# Patient Record
Sex: FEMALE | Race: WHITE | NOT HISPANIC OR LATINO
[De-identification: names, ages, dates, MRNs, and addresses within clinical notes are randomized per-mention and may not be internally consistent; named-entity substitution may affect disease eponyms.]

---

## 2021-06-28 PROBLEM — Z00.00 ENCOUNTER FOR PREVENTIVE HEALTH EXAMINATION: Status: ACTIVE | Noted: 2021-06-28

## 2021-07-15 ENCOUNTER — APPOINTMENT (OUTPATIENT)
Dept: THORACIC SURGERY | Facility: CLINIC | Age: 66
End: 2021-07-15
Payer: MEDICARE

## 2021-07-15 VITALS
DIASTOLIC BLOOD PRESSURE: 63 MMHG | RESPIRATION RATE: 17 BRPM | HEART RATE: 67 BPM | BODY MASS INDEX: 33.31 KG/M2 | TEMPERATURE: 97.3 F | SYSTOLIC BLOOD PRESSURE: 125 MMHG | WEIGHT: 188 LBS | HEIGHT: 63 IN | OXYGEN SATURATION: 96 %

## 2021-07-15 PROCEDURE — 99203 OFFICE O/P NEW LOW 30 MIN: CPT

## 2021-07-15 NOTE — PHYSICAL EXAM
[General Appearance - Alert] : alert [] : no respiratory distress [Respiration, Rhythm And Depth] : normal respiratory rhythm and effort [Exaggerated Use Of Accessory Muscles For Inspiration] : no accessory muscle use [Auscultation Breath Sounds / Voice Sounds] : lungs were clear to auscultation bilaterally [Apical Impulse] : the apical impulse was normal [Heart Rate And Rhythm] : heart rate was normal and rhythm regular [Heart Sounds] : normal S1 and S2 [Examination Of The Chest] : the chest was normal in appearance [2+] : left 2+ [Abnormal Walk] : normal gait [Oriented To Time, Place, And Person] : oriented to person, place, and time

## 2021-07-15 NOTE — REVIEW OF SYSTEMS
[Shortness Of Breath] : shortness of breath [Abdominal Pain] : abdominal pain [Heartburn] : heartburn [Negative] : Musculoskeletal

## 2021-07-16 ENCOUNTER — RESULT REVIEW (OUTPATIENT)
Age: 66
End: 2021-07-16

## 2021-07-16 NOTE — HISTORY OF PRESENT ILLNESS
[FreeTextEntry1] : 66 year old female, former smoker, quit in January after developing Covid, with a PMHx of HTN, TIA 7 yrs ago, COVID 1/21, IBS, migraines, chronic neck and back pain, cholecystectomy, and hiatal hernia repair (2010), now with recurrent hernia.\par \par CT chest/abdomen completed on 06/04/21:\par -small hiatal hernia\par -mild intrahepatic bile duct dilation on the basis of cholecystomy\par -tiny umbilical hernia \par \par EGD completed on 06/07/21:\par -large recurrence of her paraesophageal hernia in a type 2 fashion with a large paraesophageal hernia in a type 2 fashion with a large paraesophageal component with some extrinsic compression on the esophagus \par \par \par

## 2021-07-16 NOTE — ASSESSMENT
[FreeTextEntry1] : 66 year old female, former smoker, quit in January after developing Covid, with a PMHx of HTN, TIA 7 yrs ago, COVID 1/21, IBS, migraines, chronic neck and back pain, cholecystectomy, and hiatal hernia repair (2010), now with recurrent hernia.\par \par CT imaging was reviewed and with evidence of previous mesh noted. Patient admits to a long recovery from her previous hiatal hernia repair and nausea afterwards, however did have improvement from the surgical repair. \par \par Will plan for a barium esophagram and upper GI series to evaluate anatomy and determine surgical approach. Will also order a gastric emptying study to evaluate if her symptoms are related to an abnormal emptying which may be contributing to her symptoms. \par \par Will have the patient RTC after testing to determine surgical approach. Patient with symptomatic recurrent hiatal hernia, will need objective data prior to determining surgical approach.\par \par Plan:\par 1. Barium esophagram/ Upper GI series\par 2. Gastric emptying \par 3. RTC after testing \par \par I, BOBBY TAYLOR , am scribing for and in the presence of [Dr. Jacky Baker] the following sections: History of present illness, past Medical/family/surgical/family/social history, review of systems, vital signs, physical exam and disposition.\par \par Patient has evidence of a recurrent symptomatic hiatal hernia with previous mesh.  I concur with previous opinion that recommended surgery.  However, patient will require additional studies prior to proceeding with surgery which will help to determine whether or not this can be best performed through a trans-abdominal approach or trans-,-thoracic approach.\par

## 2021-07-19 ENCOUNTER — OUTPATIENT (OUTPATIENT)
Dept: OUTPATIENT SERVICES | Facility: HOSPITAL | Age: 66
LOS: 1 days | End: 2021-07-19
Payer: MEDICARE

## 2021-07-19 PROCEDURE — 78264 GASTRIC EMPTYING IMG STUDY: CPT | Mod: MG

## 2021-07-19 PROCEDURE — 78264 GASTRIC EMPTYING IMG STUDY: CPT | Mod: 26,MG

## 2021-07-19 PROCEDURE — G1004: CPT

## 2021-07-19 PROCEDURE — A9541: CPT

## 2021-07-28 ENCOUNTER — OUTPATIENT (OUTPATIENT)
Dept: OUTPATIENT SERVICES | Facility: HOSPITAL | Age: 66
LOS: 1 days | End: 2021-07-28

## 2021-07-28 ENCOUNTER — APPOINTMENT (OUTPATIENT)
Dept: RADIOLOGY | Facility: HOSPITAL | Age: 66
End: 2021-07-28
Payer: MEDICARE

## 2021-07-28 PROCEDURE — 74240 X-RAY XM UPR GI TRC 1CNTRST: CPT | Mod: 26

## 2021-07-29 ENCOUNTER — APPOINTMENT (OUTPATIENT)
Dept: THORACIC SURGERY | Facility: CLINIC | Age: 66
End: 2021-07-29
Payer: MEDICARE

## 2021-07-29 ENCOUNTER — INPATIENT (INPATIENT)
Facility: HOSPITAL | Age: 66
LOS: 5 days | Discharge: HOME | DRG: 392 | End: 2021-08-04
Attending: SPECIALIST | Admitting: SPECIALIST
Payer: MEDICARE

## 2021-07-29 VITALS
SYSTOLIC BLOOD PRESSURE: 130 MMHG | OXYGEN SATURATION: 98 % | HEIGHT: 63 IN | HEART RATE: 64 BPM | WEIGHT: 188.05 LBS | RESPIRATION RATE: 18 BRPM | DIASTOLIC BLOOD PRESSURE: 90 MMHG | TEMPERATURE: 98 F

## 2021-07-29 VITALS
HEART RATE: 67 BPM | DIASTOLIC BLOOD PRESSURE: 85 MMHG | RESPIRATION RATE: 17 BRPM | HEIGHT: 63 IN | TEMPERATURE: 97.8 F | OXYGEN SATURATION: 98 % | SYSTOLIC BLOOD PRESSURE: 128 MMHG | BODY MASS INDEX: 33.31 KG/M2 | WEIGHT: 188 LBS

## 2021-07-29 DIAGNOSIS — Z90.49 ACQUIRED ABSENCE OF OTHER SPECIFIED PARTS OF DIGESTIVE TRACT: Chronic | ICD-10-CM

## 2021-07-29 DIAGNOSIS — Z98.890 OTHER SPECIFIED POSTPROCEDURAL STATES: Chronic | ICD-10-CM

## 2021-07-29 DIAGNOSIS — K21.9 GASTRO-ESOPHAGEAL REFLUX DISEASE W/OUT ESOPHAGITIS: ICD-10-CM

## 2021-07-29 DIAGNOSIS — R10.9 UNSPECIFIED ABDOMINAL PAIN: ICD-10-CM

## 2021-07-29 DIAGNOSIS — K44.9 GASTRO-ESOPHAGEAL REFLUX DISEASE W/OUT ESOPHAGITIS: ICD-10-CM

## 2021-07-29 LAB
ALBUMIN SERPL ELPH-MCNC: 4.5 G/DL — SIGNIFICANT CHANGE UP (ref 3.3–5)
ALP SERPL-CCNC: 149 U/L — HIGH (ref 40–120)
ALT FLD-CCNC: 15 U/L — SIGNIFICANT CHANGE UP (ref 10–45)
ANION GAP SERPL CALC-SCNC: 10 MMOL/L — SIGNIFICANT CHANGE UP (ref 5–17)
APTT BLD: 28.1 SEC — SIGNIFICANT CHANGE UP (ref 27.5–35.5)
AST SERPL-CCNC: 20 U/L — SIGNIFICANT CHANGE UP (ref 10–40)
BASOPHILS # BLD AUTO: 0.05 K/UL — SIGNIFICANT CHANGE UP (ref 0–0.2)
BASOPHILS NFR BLD AUTO: 0.9 % — SIGNIFICANT CHANGE UP (ref 0–2)
BILIRUB SERPL-MCNC: 0.4 MG/DL — SIGNIFICANT CHANGE UP (ref 0.2–1.2)
BLD GP AB SCN SERPL QL: NEGATIVE — SIGNIFICANT CHANGE UP
BUN SERPL-MCNC: 22 MG/DL — SIGNIFICANT CHANGE UP (ref 7–23)
CALCIUM SERPL-MCNC: 9.7 MG/DL — SIGNIFICANT CHANGE UP (ref 8.4–10.5)
CHLORIDE SERPL-SCNC: 102 MMOL/L — SIGNIFICANT CHANGE UP (ref 96–108)
CO2 SERPL-SCNC: 28 MMOL/L — SIGNIFICANT CHANGE UP (ref 22–31)
CREAT SERPL-MCNC: 0.68 MG/DL — SIGNIFICANT CHANGE UP (ref 0.5–1.3)
EOSINOPHIL # BLD AUTO: 0.2 K/UL — SIGNIFICANT CHANGE UP (ref 0–0.5)
EOSINOPHIL NFR BLD AUTO: 3.7 % — SIGNIFICANT CHANGE UP (ref 0–6)
GLUCOSE SERPL-MCNC: 93 MG/DL — SIGNIFICANT CHANGE UP (ref 70–99)
HCT VFR BLD CALC: 41.5 % — SIGNIFICANT CHANGE UP (ref 34.5–45)
HGB BLD-MCNC: 14 G/DL — SIGNIFICANT CHANGE UP (ref 11.5–15.5)
IMM GRANULOCYTES NFR BLD AUTO: 0.4 % — SIGNIFICANT CHANGE UP (ref 0–1.5)
INR BLD: 0.9 — SIGNIFICANT CHANGE UP (ref 0.88–1.16)
LIDOCAIN IGE QN: 17 U/L — SIGNIFICANT CHANGE UP (ref 7–60)
LYMPHOCYTES # BLD AUTO: 1.71 K/UL — SIGNIFICANT CHANGE UP (ref 1–3.3)
LYMPHOCYTES # BLD AUTO: 31.4 % — SIGNIFICANT CHANGE UP (ref 13–44)
MCHC RBC-ENTMCNC: 32.7 PG — SIGNIFICANT CHANGE UP (ref 27–34)
MCHC RBC-ENTMCNC: 33.7 GM/DL — SIGNIFICANT CHANGE UP (ref 32–36)
MCV RBC AUTO: 97 FL — SIGNIFICANT CHANGE UP (ref 80–100)
MONOCYTES # BLD AUTO: 0.42 K/UL — SIGNIFICANT CHANGE UP (ref 0–0.9)
MONOCYTES NFR BLD AUTO: 7.7 % — SIGNIFICANT CHANGE UP (ref 2–14)
NEUTROPHILS # BLD AUTO: 3.05 K/UL — SIGNIFICANT CHANGE UP (ref 1.8–7.4)
NEUTROPHILS NFR BLD AUTO: 55.9 % — SIGNIFICANT CHANGE UP (ref 43–77)
NRBC # BLD: 0 /100 WBCS — SIGNIFICANT CHANGE UP (ref 0–0)
PLATELET # BLD AUTO: 286 K/UL — SIGNIFICANT CHANGE UP (ref 150–400)
POTASSIUM SERPL-MCNC: 3.9 MMOL/L — SIGNIFICANT CHANGE UP (ref 3.5–5.3)
POTASSIUM SERPL-SCNC: 3.9 MMOL/L — SIGNIFICANT CHANGE UP (ref 3.5–5.3)
PROT SERPL-MCNC: 7.4 G/DL — SIGNIFICANT CHANGE UP (ref 6–8.3)
PROTHROM AB SERPL-ACNC: 10.8 SEC — SIGNIFICANT CHANGE UP (ref 10.6–13.6)
RBC # BLD: 4.28 M/UL — SIGNIFICANT CHANGE UP (ref 3.8–5.2)
RBC # FLD: 13.3 % — SIGNIFICANT CHANGE UP (ref 10.3–14.5)
RH IG SCN BLD-IMP: NEGATIVE — SIGNIFICANT CHANGE UP
RH IG SCN BLD-IMP: NEGATIVE — SIGNIFICANT CHANGE UP
SARS-COV-2 RNA SPEC QL NAA+PROBE: SIGNIFICANT CHANGE UP
SODIUM SERPL-SCNC: 140 MMOL/L — SIGNIFICANT CHANGE UP (ref 135–145)
WBC # BLD: 5.45 K/UL — SIGNIFICANT CHANGE UP (ref 3.8–10.5)
WBC # FLD AUTO: 5.45 K/UL — SIGNIFICANT CHANGE UP (ref 3.8–10.5)

## 2021-07-29 PROCEDURE — 74178 CT ABD&PLV WO CNTR FLWD CNTR: CPT | Mod: 26,MG

## 2021-07-29 PROCEDURE — 99222 1ST HOSP IP/OBS MODERATE 55: CPT

## 2021-07-29 PROCEDURE — 99215 OFFICE O/P EST HI 40 MIN: CPT

## 2021-07-29 PROCEDURE — 99285 EMERGENCY DEPT VISIT HI MDM: CPT

## 2021-07-29 PROCEDURE — G1004: CPT

## 2021-07-29 RX ORDER — ONDANSETRON 8 MG/1
4 TABLET, FILM COATED ORAL EVERY 6 HOURS
Refills: 0 | Status: DISCONTINUED | OUTPATIENT
Start: 2021-07-29 | End: 2021-08-01

## 2021-07-29 RX ORDER — ONDANSETRON 8 MG/1
4 TABLET, FILM COATED ORAL EVERY 8 HOURS
Refills: 0 | Status: DISCONTINUED | OUTPATIENT
Start: 2021-07-29 | End: 2021-07-29

## 2021-07-29 RX ORDER — HYDROMORPHONE HYDROCHLORIDE 2 MG/ML
1 INJECTION INTRAMUSCULAR; INTRAVENOUS; SUBCUTANEOUS EVERY 4 HOURS
Refills: 0 | Status: DISCONTINUED | OUTPATIENT
Start: 2021-07-29 | End: 2021-07-29

## 2021-07-29 RX ORDER — ACETAMINOPHEN 500 MG
1000 TABLET ORAL ONCE
Refills: 0 | Status: COMPLETED | OUTPATIENT
Start: 2021-07-29 | End: 2021-07-30

## 2021-07-29 RX ORDER — IOHEXOL 300 MG/ML
30 INJECTION, SOLUTION INTRAVENOUS ONCE
Refills: 0 | Status: COMPLETED | OUTPATIENT
Start: 2021-07-29 | End: 2021-07-29

## 2021-07-29 RX ORDER — LOSARTAN POTASSIUM 100 MG/1
50 TABLET, FILM COATED ORAL DAILY
Refills: 0 | Status: DISCONTINUED | OUTPATIENT
Start: 2021-07-29 | End: 2021-08-04

## 2021-07-29 RX ORDER — PANTOPRAZOLE SODIUM 20 MG/1
40 TABLET, DELAYED RELEASE ORAL
Refills: 0 | Status: DISCONTINUED | OUTPATIENT
Start: 2021-07-29 | End: 2021-07-30

## 2021-07-29 RX ORDER — HYDROMORPHONE HYDROCHLORIDE 2 MG/ML
0.5 INJECTION INTRAMUSCULAR; INTRAVENOUS; SUBCUTANEOUS ONCE
Refills: 0 | Status: DISCONTINUED | OUTPATIENT
Start: 2021-07-29 | End: 2021-07-29

## 2021-07-29 RX ORDER — SODIUM CHLORIDE 9 MG/ML
1000 INJECTION INTRAMUSCULAR; INTRAVENOUS; SUBCUTANEOUS
Refills: 0 | Status: DISCONTINUED | OUTPATIENT
Start: 2021-07-29 | End: 2021-07-30

## 2021-07-29 RX ORDER — CYCLOBENZAPRINE HYDROCHLORIDE 10 MG/1
10 TABLET, FILM COATED ORAL THREE TIMES A DAY
Refills: 0 | Status: DISCONTINUED | OUTPATIENT
Start: 2021-07-29 | End: 2021-08-04

## 2021-07-29 RX ORDER — ONDANSETRON 8 MG/1
4 TABLET, FILM COATED ORAL ONCE
Refills: 0 | Status: COMPLETED | OUTPATIENT
Start: 2021-07-29 | End: 2021-07-29

## 2021-07-29 RX ORDER — CITALOPRAM 10 MG/1
20 TABLET, FILM COATED ORAL DAILY
Refills: 0 | Status: DISCONTINUED | OUTPATIENT
Start: 2021-07-29 | End: 2021-08-04

## 2021-07-29 RX ORDER — SODIUM CHLORIDE 9 MG/ML
1000 INJECTION INTRAMUSCULAR; INTRAVENOUS; SUBCUTANEOUS ONCE
Refills: 0 | Status: COMPLETED | OUTPATIENT
Start: 2021-07-29 | End: 2021-07-29

## 2021-07-29 RX ORDER — HYDROMORPHONE HYDROCHLORIDE 2 MG/ML
1 INJECTION INTRAMUSCULAR; INTRAVENOUS; SUBCUTANEOUS EVERY 4 HOURS
Refills: 0 | Status: DISCONTINUED | OUTPATIENT
Start: 2021-07-29 | End: 2021-08-01

## 2021-07-29 RX ORDER — AMLODIPINE BESYLATE 2.5 MG/1
2.5 TABLET ORAL DAILY
Refills: 0 | Status: DISCONTINUED | OUTPATIENT
Start: 2021-07-29 | End: 2021-08-04

## 2021-07-29 RX ORDER — SODIUM CHLORIDE 9 MG/ML
3 INJECTION INTRAMUSCULAR; INTRAVENOUS; SUBCUTANEOUS EVERY 8 HOURS
Refills: 0 | Status: DISCONTINUED | OUTPATIENT
Start: 2021-07-29 | End: 2021-08-04

## 2021-07-29 RX ORDER — HEPARIN SODIUM 5000 [USP'U]/ML
5000 INJECTION INTRAVENOUS; SUBCUTANEOUS EVERY 8 HOURS
Refills: 0 | Status: DISCONTINUED | OUTPATIENT
Start: 2021-07-29 | End: 2021-08-04

## 2021-07-29 RX ORDER — ZOLPIDEM TARTRATE 10 MG/1
5 TABLET ORAL AT BEDTIME
Refills: 0 | Status: DISCONTINUED | OUTPATIENT
Start: 2021-07-29 | End: 2021-08-04

## 2021-07-29 RX ORDER — CLONAZEPAM 1 MG
1 TABLET ORAL AT BEDTIME
Refills: 0 | Status: DISCONTINUED | OUTPATIENT
Start: 2021-07-29 | End: 2021-08-04

## 2021-07-29 RX ORDER — ONDANSETRON 8 MG/1
4 TABLET, FILM COATED ORAL EVERY 6 HOURS
Refills: 0 | Status: DISCONTINUED | OUTPATIENT
Start: 2021-07-29 | End: 2021-07-29

## 2021-07-29 RX ADMIN — HYDROMORPHONE HYDROCHLORIDE 0.5 MILLIGRAM(S): 2 INJECTION INTRAMUSCULAR; INTRAVENOUS; SUBCUTANEOUS at 16:00

## 2021-07-29 RX ADMIN — HYDROMORPHONE HYDROCHLORIDE 0.5 MILLIGRAM(S): 2 INJECTION INTRAMUSCULAR; INTRAVENOUS; SUBCUTANEOUS at 14:33

## 2021-07-29 RX ADMIN — ONDANSETRON 4 MILLIGRAM(S): 8 TABLET, FILM COATED ORAL at 20:38

## 2021-07-29 RX ADMIN — HEPARIN SODIUM 5000 UNIT(S): 5000 INJECTION INTRAVENOUS; SUBCUTANEOUS at 22:42

## 2021-07-29 RX ADMIN — ONDANSETRON 4 MILLIGRAM(S): 8 TABLET, FILM COATED ORAL at 14:33

## 2021-07-29 RX ADMIN — HYDROMORPHONE HYDROCHLORIDE 1 MILLIGRAM(S): 2 INJECTION INTRAMUSCULAR; INTRAVENOUS; SUBCUTANEOUS at 20:36

## 2021-07-29 RX ADMIN — SODIUM CHLORIDE 1000 MILLILITER(S): 9 INJECTION INTRAMUSCULAR; INTRAVENOUS; SUBCUTANEOUS at 14:33

## 2021-07-29 RX ADMIN — HYDROMORPHONE HYDROCHLORIDE 0.5 MILLIGRAM(S): 2 INJECTION INTRAMUSCULAR; INTRAVENOUS; SUBCUTANEOUS at 15:00

## 2021-07-29 RX ADMIN — SODIUM CHLORIDE 3 MILLILITER(S): 9 INJECTION INTRAMUSCULAR; INTRAVENOUS; SUBCUTANEOUS at 22:36

## 2021-07-29 RX ADMIN — HYDROMORPHONE HYDROCHLORIDE 0.5 MILLIGRAM(S): 2 INJECTION INTRAMUSCULAR; INTRAVENOUS; SUBCUTANEOUS at 15:48

## 2021-07-29 RX ADMIN — HYDROMORPHONE HYDROCHLORIDE 0.5 MILLIGRAM(S): 2 INJECTION INTRAMUSCULAR; INTRAVENOUS; SUBCUTANEOUS at 17:23

## 2021-07-29 RX ADMIN — IOHEXOL 30 MILLILITER(S): 300 INJECTION, SOLUTION INTRAVENOUS at 14:33

## 2021-07-29 RX ADMIN — Medication 30 MILLILITER(S): at 23:36

## 2021-07-29 NOTE — H&P ADULT - ASSESSMENT
66 year old female, former smoker, quit in January after developing Covid, with a PMHx of HTN, TIA 7 yrs ago, COVID 1/21, IBS, migraines, chronic neck and back pain, cholecystectomy, and hiatal hernia repair (2010), now with recurrent hernia. She presented to Dr. Baker office today to review esophagram and gastric emptying study. In office patient states she has been very nauseous with severe abdominal pain, vomiting multiple times during the day. Patient appears unwell and pale, referred to the ED For hydration, blood work, and CT Abdomen with IV/Oral contrast. Upon exam patient appears in discomfort, states pain on mildly controlled. Patient able to tolerate PO contrast. Patient otherwise denies dizziness, vision changes, chest pain, palpitations, shortness of breath, cough, n/v/d, extremity swelling, calf tenderness, sick contacts.     Neurovascular:   No delirium. Pain well controlled with current regimen.  -Tylenol PRN for pain    Cardiovascular:   Hemodynamically stable. HR controlled      Respiratory:   02 Sat = 98% on RA.  -Wean to RA from for O2 Sat > 93%.  -Encourage Cough, deep breathing and Use of IS 10x / hr while awake.  -Chest PT 4xdaily    GI:   PMHx Hiatial Hernia repair 2010 with recurrent herniation      - CT completed, final read as above  -protonix for GI protection  -NPO pending surgical reccomendations      Renal / :   BUN/Cr Stable  -Continue to monitor I/O's.    Endocrine:    Blood sugar stable      Hematologic:  H/H stable  -DVT prophylaxis with Heparin sq    ID:  -Afebrile  -Continue to observe for SIRS/Sepsis Syndrome.    Disposition:  Possible home 66 year old female, former smoker, quit in January after developing Covid, with a PMHx of HTN, TIA 7 yrs ago, COVID 1/21, IBS, migraines, chronic neck and back pain, cholecystectomy, and hiatal hernia repair (2010), now with recurrent hernia. She presented to Dr. Baker office today to review esophagram and gastric emptying study. In office patient states she has been very nauseous with severe abdominal pain, vomiting multiple times during the day. Patient appears unwell and pale, referred to the ED For hydration, blood work, and CT Abdomen with IV/Oral contrast. Upon exam patient appears in discomfort, states pain on mildly controlled. Patient able to tolerate PO contrast. Patient otherwise denies dizziness, vision changes, chest pain, palpitations, shortness of breath, cough, n/v/d, extremity swelling, calf tenderness, sick contacts.     Neurovascular:   No delirium. Pain well controlled with current regimen.  -Tylenol PRN for pain    Cardiovascular:   Hemodynamically stable. HR controlled      Respiratory:   02 Sat = 98% on RA.  -Wean to RA from for O2 Sat > 93%.  -Encourage Cough, deep breathing and Use of IS 10x / hr while awake.  -Chest PT 4xdaily    GI:   PMHx Hiatial Hernia repair 2010 with recurrent herniation      - CT completed, final read as above  -protonix for GI protection  -IV Hydration  -Clear liquid to assess ability to tolerate PO, can advance in AM to regular diet      Renal / :   BUN/Cr Stable  -Continue to monitor I/O's.    Endocrine:    Blood sugar stable      Hematologic:  H/H stable  -DVT prophylaxis with Heparin sq    ID:  -Afebrile  -Continue to observe for SIRS/Sepsis Syndrome.    Disposition:  Possible home

## 2021-07-29 NOTE — H&P ADULT - NSHPREVIEWOFSYSTEMS_GEN_ALL_CORE
66 year old female, former smoker, quit in January after developing Covid, with a PMHx of HTN, TIA 7 yrs ago, COVID 1/21, IBS, migraines, chronic neck and back pain, cholecystectomy, and hiatal hernia repair (2010), now with recurrent hernia. She presents today to review esophagram and gastric emptying study. She was initiall referred by  **    CT chest/abdomen completed on 06/04/21:  -small hiatal hernia  -mild intrahepatic bile duct dilation on the basis of cholecystomy  -tiny umbilical hernia     EGD completed on 06/07/21:  -large recurrence of her paraesophageal hernia in a type 2 fashion with a large paraesophageal hernia in a type 2 fashion with a large paraesophageal component with some extrinsic compression on the esophagus     gastric emptying study completed on 7/16/21:  -Normal gastric emptying study

## 2021-07-29 NOTE — REVIEW OF SYSTEMS
[Feeling Poorly] : feeling poorly [Abdominal Pain] : abdominal pain [Vomiting] : vomiting [Heartburn] : heartburn [Negative] : Musculoskeletal

## 2021-07-29 NOTE — ED ADULT NURSE NOTE - OBJECTIVE STATEMENT
c/o abdominal pain, vomiting (non-bloody), with associated lightheadedness for the past 3 weeks, worst in the last 5 days. Unable to tolerated PO. Epigastric pain 8/10, constant. hx hiatal hernia. scheduled to discuss surgery today but referred to ED by Dr Sifuentes due to symptoms

## 2021-07-29 NOTE — ED ADULT TRIAGE NOTE - CHIEF COMPLAINT QUOTE
c/o abdominal pain, vomiting (non-bloody), with associated lightheadedness for the past 5 days. hx hiatal hernia. scheduled to discuss surgery today but referred to ED by Dr Sifuentes due to symptoms

## 2021-07-29 NOTE — H&P ADULT - NSHPLABSRESULTS_GEN_ALL_CORE
< from: CT Abdomen and Pelvis w/ Oral Cont and w/wo IV Cont (07.29.21 @ 16:18) >    Bowel/Peritoneum: Redemonstrated partial herniation of fundoplication wrap into chest with slippage through intact wrap. High density material in large and small bowel loops related to recent upper GI exam. No bowel obstruction or evidence of bowel inflammation. Nonvisualized appendix.. Few colonic diverticula.    Lymph nodes: No lymphadenopathy.    Aorta/IVC: Normal caliber.    Abdominal wall: Tiny fat-containing umbilical hernia.    Bones/Soft tissues: Within normal limits.    IMPRESSION:    Redemonstrated partial herniation of fundoplication wrap into chest with slippage.    --- End of Report ---    < end of copied text >      CT chest/abdomen completed on 06/04/21:  -small hiatal hernia  -mild intrahepatic bile duct dilation on the basis of cholecystomy  -tiny umbilical hernia     EGD completed on 06/07/21:  -large recurrence of her paraesophageal hernia in a type 2 fashion with a large paraesophageal hernia in a type 2 fashion with a large paraesophageal component with some extrinsic compression on the esophagus     gastric emptying study completed on 7/16/21:  -Normal gastric emptying study

## 2021-07-29 NOTE — H&P ADULT - NSICDXPASTMEDICALHX_GEN_ALL_CORE_FT
PAST MEDICAL HISTORY:  History of COVID-19 1/2021    History of TIAs 2014    HLD (hyperlipidemia)     HTN (hypertension)

## 2021-07-29 NOTE — ED PROVIDER NOTE - CLINICAL SUMMARY MEDICAL DECISION MAKING FREE TEXT BOX
avss. nontoxic. NAD. no systemic sx. no active cp. no acute resp distress. no acute surgical abd. no e/o sepsis. LFTs/lipase neg. ua neg. pain controlled s/p dilaudid. cts consulted. will admit per reccs.

## 2021-07-29 NOTE — ED PROVIDER NOTE - PROGRESS NOTE DETAILS
cts consulted. reccs for ct a/p w/ oral contrast w/wo iv contrast. will see pt. cts reccs for admission to dr hare.

## 2021-07-29 NOTE — H&P ADULT - NSHPPHYSICALEXAM_GEN_ALL_CORE
Neuro: A+O x 3, non-focal, speech clear and intact  HEENT: PERRL, EOMI, oral mucosa pink and moist  Neck: supple, no JVD  CV: regular rate, regular rhythm, +S1S2, no murmurs or rub  Pulm/chest: lung sounds CTA and equal bilaterally, no accessory muscle use noted  Abd: soft, tender to palpation throughout  Ext: SCHULER x 4, no C/C/E  Skin: warm, well perfused, no rashes

## 2021-07-29 NOTE — H&P ADULT - NSHPSOCIALHISTORY_GEN_ALL_CORE
66 year old female, former smoker, quit in January after developing Covid, with a PMHx of HTN, TIA 7 yrs ago, COVID 1/21, IBS, migraines, chronic neck and back pain, cholecystectomy, and hiatal hernia repair (2010), now with recurrent hernia.    CT chest/abdomen completed on 06/04/21:  -small hiatal hernia  -mild intrahepatic bile duct dilation on the basis of cholecystomy  -tiny umbilical hernia     EGD completed on 06/07/21:  -large recurrence of her paraesophageal hernia in a type 2 fashion with a large paraesophageal hernia in a type 2 fashion with a large paraesophageal component with some extrinsic compression on the esophagus       Hiatal hernia with GERD (530.81,553.3) (K21.9,K44.9)    66 year old female, former smoker, quit in January after developing Covid, with a PMHx of HTN, TIA 7 yrs ago, COVID 1/21, IBS, migraines, chronic neck and back pain, cholecystectomy, and hiatal hernia repair (2010), now with recurrent hernia.    CT imaging was reviewed and with evidence of previous mesh noted. Patient admits to a long recovery from her previous hiatal hernia repair and nausea afterwards, however did have improvement from the surgical repair.     Will plan for a barium esophagram and upper GI series to evaluate anatomy and determine surgical approach. Will also order a gastric emptying study to evaluate if her symptoms are related to an abnormal emptying which may be contributing to her symptoms.     Will have the patient RTC after testing to determine surgical approach. Patient with symptomatic recurrent hiatal hernia, will need objective data prior to determining surgical approach.    Plan:  1. Barium esophagram/ Upper GI series  2. Gastric emptying   3. RTC after testing     IBOBBY am scribing for and in the presence of [Dr. Jacky Baker] the following sections: History of present illness, past Medical/family/surgical/family/social history, review of systems, vital signs, physical exam and disposition.    Patient has evidence of a recurrent symptomatic hiatal hernia with previous mesh. I concur with previous opinion that recommended surgery. However, patient will require additional studies prior to proceeding with surgery which will help to determine whether or not this can be best performed through a trans-abdominal approach or trans-,-thoracic approach. Tobacco:  Alcohol:  Elicit drugs:  Occupation:  Home: Lives with , Stairs, assist devices, ADL

## 2021-07-29 NOTE — ED PROVIDER NOTE - PHYSICAL EXAMINATION
CONST: nontoxic NAD speaking in full sentences  HEAD: atraumatic  EYES: conjunctivae clear, PERRL, EOMI  ENT: mmm  NECK: supple/FROM  CARD: rrr no murmurs  CHEST: ctab no r/r/w  ABD: soft, nd, +ttp epigastric, no rebound/guarding  EXT: FROM, symmetric distal pulses intact  SKIN: warm, dry, no rash, no pedal edema/ttp/rash, cap refill <2sec  NEURO: a+ox3, 5/5 strength x4, gross sensation intact x4, baseline gait

## 2021-07-29 NOTE — PHYSICAL EXAM
[] : no respiratory distress [Respiration, Rhythm And Depth] : normal respiratory rhythm and effort [Exaggerated Use Of Accessory Muscles For Inspiration] : no accessory muscle use [Auscultation Breath Sounds / Voice Sounds] : lungs were clear to auscultation bilaterally [Apical Impulse] : the apical impulse was normal [Heart Rate And Rhythm] : heart rate was normal and rhythm regular [Heart Sounds] : normal S1 and S2 [Examination Of The Chest] : the chest was normal in appearance [2+] : left 2+ [Abdomen Soft] : soft [Abnormal Walk] : normal gait [Oriented To Time, Place, And Person] : oriented to person, place, and time

## 2021-07-29 NOTE — ED PROVIDER NOTE - OBJECTIVE STATEMENT
66F former smoker, htn, tia (2014), cholecystectomy, hiatal hernia s/p mesh repair, prior covid19 infection not req hosptialization (1/2021), referred in by dr hare for admission. pt c/o progressively worsening epigastric abd pain/po interolance since mesh repair. last saw cts for preop planning (7/15/21). no fever/chills, no uri/cough, no cp/sob, no vomiting, no diarrhea, no hematochezia/melena, no dysuria, no covid19 vaccination, no trauma, no etoh-dpt/ivdu.     cts: ananya 66F former smoker, htn, tia (2014), cholecystectomy, hiatal hernia s/p mesh repair, prior covid19 infection not req hospitalization (1/2021), referred in by dr hare for admission. pt c/o progressively worsening epigastric abd pain/po intolerance since mesh repair. last saw cts for preop planning (7/15/21). no fever/chills, no uri/cough, no cp/sob, no vomiting, no diarrhea, no hematochezia/melena, no dysuria, no covid19 vaccination, no trauma, no etoh-dpt/ivdu.     cts: ananya

## 2021-07-29 NOTE — H&P ADULT - HISTORY OF PRESENT ILLNESS
66 year old female, former smoker, quit in January after developing Covid, with a PMHx of HTN, TIA 7 yrs ago, COVID 1/21, IBS, migraines, chronic neck and back pain, cholecystectomy, and hiatal hernia repair (2010), now with recurrent hernia. She presented to Dr. Baker office today to review esophagram and gastric emptying study. In office patient states she has been very nauseous with severe abdominal pain, vomitting multiple times during the day. Patient appears unwell and pale, referred to the ED For hydration, bloodwork and CT Abdomen with IV/Oral contrast. Upon exam patient appears in discomfort, states pain on mildly controlled. Patient able to tolerate PO contrast. Patient otherwise denies dizziness, vision changes, chest pain, palpitations, shortness of breath, cough, n/v/d, extremity swelling, calf tenderness, sick contacts.     Patient not currently vaccinated, awaiting COVID swab.  66 year old female, former smoker, quit in January after developing Covid, with a PMHx of HTN, TIA 7 yrs ago, COVID 1/21, IBS, migraines, chronic neck and back pain, cholecystectomy, and hiatal hernia repair (2010), now with recurrent hernia. She presented to Dr. Baker office today to review esophagram and gastric emptying study. In office patient states she has been very nauseous with severe abdominal pain, vomitting multiple times during the day. Patient appears unwell and pale, referred to the ED For hydration, bloodwork and CT Abdomen with IV/Oral contrast. Upon exam patient appears in discomfort, states pain on mildly controlled typically takes 1-1.5mg of dilaudid for her pain. Patient able to tolerate PO contrast. Patient otherwise denies dizziness, vision changes, chest pain, palpitations, shortness of breath, cough, n/v/d, extremity swelling, calf tenderness, sick contacts.     Patient not currently vaccinated, awaiting COVID swab.

## 2021-07-30 ENCOUNTER — TRANSCRIPTION ENCOUNTER (OUTPATIENT)
Age: 66
End: 2021-07-30

## 2021-07-30 LAB
ALBUMIN SERPL ELPH-MCNC: 4.4 G/DL — SIGNIFICANT CHANGE UP (ref 3.3–5)
ALP SERPL-CCNC: 147 U/L — HIGH (ref 40–120)
ALT FLD-CCNC: 33 U/L — SIGNIFICANT CHANGE UP (ref 10–45)
ANION GAP SERPL CALC-SCNC: 10 MMOL/L — SIGNIFICANT CHANGE UP (ref 5–17)
AST SERPL-CCNC: 45 U/L — HIGH (ref 10–40)
BASOPHILS # BLD AUTO: 0.03 K/UL — SIGNIFICANT CHANGE UP (ref 0–0.2)
BASOPHILS NFR BLD AUTO: 0.6 % — SIGNIFICANT CHANGE UP (ref 0–2)
BILIRUB SERPL-MCNC: 0.4 MG/DL — SIGNIFICANT CHANGE UP (ref 0.2–1.2)
BUN SERPL-MCNC: 22 MG/DL — SIGNIFICANT CHANGE UP (ref 7–23)
CALCIUM SERPL-MCNC: 9.6 MG/DL — SIGNIFICANT CHANGE UP (ref 8.4–10.5)
CHLORIDE SERPL-SCNC: 100 MMOL/L — SIGNIFICANT CHANGE UP (ref 96–108)
CO2 SERPL-SCNC: 26 MMOL/L — SIGNIFICANT CHANGE UP (ref 22–31)
COVID-19 SPIKE DOMAIN AB INTERP: POSITIVE
COVID-19 SPIKE DOMAIN ANTIBODY RESULT: 33.5 U/ML — HIGH
CREAT SERPL-MCNC: 0.77 MG/DL — SIGNIFICANT CHANGE UP (ref 0.5–1.3)
EOSINOPHIL # BLD AUTO: 0.18 K/UL — SIGNIFICANT CHANGE UP (ref 0–0.5)
EOSINOPHIL NFR BLD AUTO: 3.6 % — SIGNIFICANT CHANGE UP (ref 0–6)
GLUCOSE SERPL-MCNC: 114 MG/DL — HIGH (ref 70–99)
HCT VFR BLD CALC: 38.4 % — SIGNIFICANT CHANGE UP (ref 34.5–45)
HCV AB S/CO SERPL IA: 0.04 S/CO — SIGNIFICANT CHANGE UP
HCV AB SERPL-IMP: SIGNIFICANT CHANGE UP
HGB BLD-MCNC: 13 G/DL — SIGNIFICANT CHANGE UP (ref 11.5–15.5)
IMM GRANULOCYTES NFR BLD AUTO: 0.4 % — SIGNIFICANT CHANGE UP (ref 0–1.5)
LYMPHOCYTES # BLD AUTO: 1.1 K/UL — SIGNIFICANT CHANGE UP (ref 1–3.3)
LYMPHOCYTES # BLD AUTO: 22.2 % — SIGNIFICANT CHANGE UP (ref 13–44)
MAGNESIUM SERPL-MCNC: 2.4 MG/DL — SIGNIFICANT CHANGE UP (ref 1.6–2.6)
MCHC RBC-ENTMCNC: 33.1 PG — SIGNIFICANT CHANGE UP (ref 27–34)
MCHC RBC-ENTMCNC: 33.9 GM/DL — SIGNIFICANT CHANGE UP (ref 32–36)
MCV RBC AUTO: 97.7 FL — SIGNIFICANT CHANGE UP (ref 80–100)
MONOCYTES # BLD AUTO: 0.32 K/UL — SIGNIFICANT CHANGE UP (ref 0–0.9)
MONOCYTES NFR BLD AUTO: 6.5 % — SIGNIFICANT CHANGE UP (ref 2–14)
NEUTROPHILS # BLD AUTO: 3.31 K/UL — SIGNIFICANT CHANGE UP (ref 1.8–7.4)
NEUTROPHILS NFR BLD AUTO: 66.7 % — SIGNIFICANT CHANGE UP (ref 43–77)
NRBC # BLD: 0 /100 WBCS — SIGNIFICANT CHANGE UP (ref 0–0)
PLATELET # BLD AUTO: 261 K/UL — SIGNIFICANT CHANGE UP (ref 150–400)
POTASSIUM SERPL-MCNC: 4.6 MMOL/L — SIGNIFICANT CHANGE UP (ref 3.5–5.3)
POTASSIUM SERPL-SCNC: 4.6 MMOL/L — SIGNIFICANT CHANGE UP (ref 3.5–5.3)
PROT SERPL-MCNC: 7.1 G/DL — SIGNIFICANT CHANGE UP (ref 6–8.3)
RBC # BLD: 3.93 M/UL — SIGNIFICANT CHANGE UP (ref 3.8–5.2)
RBC # FLD: 13.1 % — SIGNIFICANT CHANGE UP (ref 10.3–14.5)
SARS-COV-2 IGG+IGM SERPL QL IA: 33.5 U/ML — HIGH
SARS-COV-2 IGG+IGM SERPL QL IA: POSITIVE
SODIUM SERPL-SCNC: 136 MMOL/L — SIGNIFICANT CHANGE UP (ref 135–145)
WBC # BLD: 4.96 K/UL — SIGNIFICANT CHANGE UP (ref 3.8–10.5)
WBC # FLD AUTO: 4.96 K/UL — SIGNIFICANT CHANGE UP (ref 3.8–10.5)

## 2021-07-30 PROCEDURE — 43235 EGD DIAGNOSTIC BRUSH WASH: CPT

## 2021-07-30 PROCEDURE — 71046 X-RAY EXAM CHEST 2 VIEWS: CPT | Mod: 26

## 2021-07-30 PROCEDURE — 99222 1ST HOSP IP/OBS MODERATE 55: CPT | Mod: 25

## 2021-07-30 PROCEDURE — 99232 SBSQ HOSP IP/OBS MODERATE 35: CPT

## 2021-07-30 PROCEDURE — 71045 X-RAY EXAM CHEST 1 VIEW: CPT | Mod: 26,59

## 2021-07-30 RX ORDER — SODIUM CHLORIDE 9 MG/ML
1000 INJECTION INTRAMUSCULAR; INTRAVENOUS; SUBCUTANEOUS
Refills: 0 | Status: DISCONTINUED | OUTPATIENT
Start: 2021-07-30 | End: 2021-08-02

## 2021-07-30 RX ORDER — PANTOPRAZOLE SODIUM 20 MG/1
40 TABLET, DELAYED RELEASE ORAL EVERY 12 HOURS
Refills: 0 | Status: DISCONTINUED | OUTPATIENT
Start: 2021-07-30 | End: 2021-08-04

## 2021-07-30 RX ORDER — ACETAMINOPHEN 500 MG
1000 TABLET ORAL ONCE
Refills: 0 | Status: COMPLETED | OUTPATIENT
Start: 2021-07-30 | End: 2021-07-30

## 2021-07-30 RX ADMIN — HYDROMORPHONE HYDROCHLORIDE 1 MILLIGRAM(S): 2 INJECTION INTRAMUSCULAR; INTRAVENOUS; SUBCUTANEOUS at 04:00

## 2021-07-30 RX ADMIN — SODIUM CHLORIDE 50 MILLILITER(S): 9 INJECTION INTRAMUSCULAR; INTRAVENOUS; SUBCUTANEOUS at 00:03

## 2021-07-30 RX ADMIN — SODIUM CHLORIDE 75 MILLILITER(S): 9 INJECTION INTRAMUSCULAR; INTRAVENOUS; SUBCUTANEOUS at 15:15

## 2021-07-30 RX ADMIN — ONDANSETRON 4 MILLIGRAM(S): 8 TABLET, FILM COATED ORAL at 21:43

## 2021-07-30 RX ADMIN — HYDROMORPHONE HYDROCHLORIDE 1 MILLIGRAM(S): 2 INJECTION INTRAMUSCULAR; INTRAVENOUS; SUBCUTANEOUS at 22:15

## 2021-07-30 RX ADMIN — HYDROMORPHONE HYDROCHLORIDE 1 MILLIGRAM(S): 2 INJECTION INTRAMUSCULAR; INTRAVENOUS; SUBCUTANEOUS at 03:36

## 2021-07-30 RX ADMIN — ONDANSETRON 4 MILLIGRAM(S): 8 TABLET, FILM COATED ORAL at 15:26

## 2021-07-30 RX ADMIN — SODIUM CHLORIDE 3 MILLILITER(S): 9 INJECTION INTRAMUSCULAR; INTRAVENOUS; SUBCUTANEOUS at 15:15

## 2021-07-30 RX ADMIN — PANTOPRAZOLE SODIUM 40 MILLIGRAM(S): 20 TABLET, DELAYED RELEASE ORAL at 17:26

## 2021-07-30 RX ADMIN — HYDROMORPHONE HYDROCHLORIDE 1 MILLIGRAM(S): 2 INJECTION INTRAMUSCULAR; INTRAVENOUS; SUBCUTANEOUS at 10:49

## 2021-07-30 RX ADMIN — HEPARIN SODIUM 5000 UNIT(S): 5000 INJECTION INTRAVENOUS; SUBCUTANEOUS at 06:28

## 2021-07-30 RX ADMIN — ONDANSETRON 4 MILLIGRAM(S): 8 TABLET, FILM COATED ORAL at 10:50

## 2021-07-30 RX ADMIN — Medication 400 MILLIGRAM(S): at 00:12

## 2021-07-30 RX ADMIN — Medication 1000 MILLIGRAM(S): at 10:00

## 2021-07-30 RX ADMIN — SODIUM CHLORIDE 3 MILLILITER(S): 9 INJECTION INTRAMUSCULAR; INTRAVENOUS; SUBCUTANEOUS at 21:12

## 2021-07-30 RX ADMIN — Medication 400 MILLIGRAM(S): at 09:47

## 2021-07-30 RX ADMIN — ONDANSETRON 4 MILLIGRAM(S): 8 TABLET, FILM COATED ORAL at 03:48

## 2021-07-30 RX ADMIN — Medication 400 MILLIGRAM(S): at 18:44

## 2021-07-30 RX ADMIN — SODIUM CHLORIDE 3 MILLILITER(S): 9 INJECTION INTRAMUSCULAR; INTRAVENOUS; SUBCUTANEOUS at 05:39

## 2021-07-30 RX ADMIN — HEPARIN SODIUM 5000 UNIT(S): 5000 INJECTION INTRAVENOUS; SUBCUTANEOUS at 21:43

## 2021-07-30 RX ADMIN — HEPARIN SODIUM 5000 UNIT(S): 5000 INJECTION INTRAVENOUS; SUBCUTANEOUS at 15:21

## 2021-07-30 RX ADMIN — HYDROMORPHONE HYDROCHLORIDE 1 MILLIGRAM(S): 2 INJECTION INTRAMUSCULAR; INTRAVENOUS; SUBCUTANEOUS at 21:45

## 2021-07-30 RX ADMIN — HYDROMORPHONE HYDROCHLORIDE 1 MILLIGRAM(S): 2 INJECTION INTRAMUSCULAR; INTRAVENOUS; SUBCUTANEOUS at 11:00

## 2021-07-30 RX ADMIN — Medication 1000 MILLIGRAM(S): at 01:59

## 2021-07-30 NOTE — PROGRESS NOTE ADULT - SUBJECTIVE AND OBJECTIVE BOX
Patient discussed on morning rounds with Dr. Baker      Operation / Date: Recurrent Hiatal hernia    SUBJECTIVE ASSESSMENT:  66y Female seen and examined at bedside.  Patient states she had nausea with clears overnight.  EGD today.        Vital Signs Last 24 Hrs  T(C): 36.3 (30 Jul 2021 13:35), Max: 36.6 (29 Jul 2021 15:46)  T(F): 97.4 (30 Jul 2021 13:35), Max: 97.9 (30 Jul 2021 01:12)  HR: 66 (30 Jul 2021 08:38) (51 - 70)  BP: 112/64 (30 Jul 2021 08:38) (85/59 - 159/87)  BP(mean): 84 (30 Jul 2021 08:38) (84 - 113)  RR: 16 (30 Jul 2021 08:38) (16 - 20)  SpO2: 98% (30 Jul 2021 08:38) (92% - 98%)  I&O's Detail        PHYSICAL EXAM:    Neuro: A+O x 3, non-focal, speech clear and intact  HEENT: PERRL, EOMI, oral mucosa pink and moist  Neck: supple, no JVD  CV: regular rate, regular rhythm, +S1S2, no murmurs or rub  Pulm/chest: lung sounds CTA and equal bilaterally, no accessory muscle use noted  Abd: soft, tender to palpation throughout  Ext: SCHULER x 4, no C/C/E  Skin: warm, well perfused, no rashes      LABS:                        13.0   4.96  )-----------( 261      ( 30 Jul 2021 06:41 )             38.4       COUMADIN:  No. REASON: .    PT/INR - ( 29 Jul 2021 14:26 )   PT: 10.8 sec;   INR: 0.90          PTT - ( 29 Jul 2021 14:26 )  PTT:28.1 sec    07-30    136  |  100  |  22  ----------------------------<  114<H>  4.6   |  26  |  0.77    Ca    9.6      30 Jul 2021 06:41  Mg     2.4     07-30    TPro  7.1  /  Alb  4.4  /  TBili  0.4  /  DBili  x   /  AST  45<H>  /  ALT  33  /  AlkPhos  147<H>  07-30          MEDICATIONS  (STANDING):  amLODIPine   Tablet 2.5 milliGRAM(s) Oral daily  citalopram 20 milliGRAM(s) Oral daily  heparin   Injectable 5000 Unit(s) SubCutaneous every 8 hours  losartan 50 milliGRAM(s) Oral daily  pantoprazole  Injectable 40 milliGRAM(s) IV Push every 12 hours  sodium chloride 0.9% lock flush 3 milliLiter(s) IV Push every 8 hours  sodium chloride 0.9%. 1000 milliLiter(s) (75 mL/Hr) IV Continuous <Continuous>    MEDICATIONS  (PRN):  aluminum hydroxide/magnesium hydroxide/simethicone Suspension 30 milliLiter(s) Oral every 4 hours PRN Dyspepsia  clonazePAM  Tablet 1 milliGRAM(s) Oral at bedtime PRN agitation/ insomnia  cyclobenzaprine 10 milliGRAM(s) Oral three times a day PRN Muscle Spasm  HYDROmorphone  Injectable 1 milliGRAM(s) IV Push every 4 hours PRN Moderate Pain (4 - 6)  ondansetron Injectable 4 milliGRAM(s) IV Push every 6 hours PRN Nausea and/or Vomiting  zolpidem 5 milliGRAM(s) Oral at bedtime PRN Insomnia        RADIOLOGY & ADDITIONAL TESTS:

## 2021-07-30 NOTE — CONSULT NOTE ADULT - ASSESSMENT
66 year old female, former smoker, quit in January after developing Covid, with a PMHx of HTN, TIA 7 yrs ago, COVID 1/21, IBS, migraines, chronic neck and back pain, cholecystectomy, and hiatal hernia repair (2010), now with recurrent hernia.     #Nausea, vomiting, hiatal hernia  - s/p EGD: large hiatal hernia was visualized at the distal esophagus. There was pooling of gastric contents refluxing back into the esophagus, with associated esophagitis. There was approximately 100 cc of fluid in the gastric fundus, that was suctioned out. There was some mild gastritis also visualized in the fundus. The procedure was subsequently aborted as patient became apneic and hypoxic.   - Protonix 40 mg IV q 12 h   - NPO for 4 hrs, then if well, clear diet and advance as tolerated   - Chest X-Ray (PA/lateral) does not appear to be suggestive of aspiration, but quite early postprocedurally and would have low threshold to start abx    Joanie Claudio MD  PGY-5, Gastroenterology Fellow  pager: [phone]

## 2021-07-30 NOTE — CONSULT NOTE ADULT - SUBJECTIVE AND OBJECTIVE BOX
GASTROENTEROLOGY CONSULT NOTE  HPI:  66 year old female, former smoker, quit in January after developing Covid, with a PMHx of HTN, TIA 7 yrs ago, COVID 1/21, IBS, migraines, chronic neck and back pain, cholecystectomy, and hiatal hernia repair (2010), now with recurrent hernia. She presented to Dr. Baker office today to review esophagram and gastric emptying study. In office patient states she has been very nauseous with severe abdominal pain, vomitting multiple times during the day. Patient appears unwell and pale, referred to the ED For hydration, bloodwork and CT Abdomen with IV/Oral contrast. Upon exam patient appears in discomfort, states pain on mildly controlled typically takes 1-1.5mg of dilaudid for her pain. Patient able to tolerate PO contrast. Patient otherwise denies dizziness, vision changes, chest pain, palpitations, shortness of breath, cough, n/v/d, extremity swelling, calf tenderness, sick contacts.     Patient not currently vaccinated, awaiting COVID swab.  (29 Jul 2021 16:39)    GI consulted for n/v, hiatal hernia. Patient seen and examined at bedside.     Allergies    penicillin (Unknown)    Intolerances      Home Medications:  amLODIPine 2.5 mg oral tablet: 1 tab(s) orally once a day (29 Jul 2021 18:08)  cetirizine 10 mg oral tablet: 1 tab(s) orally once a day (29 Jul 2021 18:08)  citalopram 20 mg oral tablet: 1 tab(s) orally once a day (29 Jul 2021 18:08)  clonazePAM 1 mg oral tablet: orally once a day (at bedtime), As Needed (29 Jul 2021 18:08)  cyclobenzaprine 10 mg oral tablet: 1 tab(s) orally 3 times a day, As Needed (29 Jul 2021 18:08)  Dexilant 60 mg oral delayed release capsule: 1 cap(s) orally once a day (29 Jul 2021 18:08)  dicyclomine 20 mg oral tablet: 1 tab(s) orally 4 times a day, As Needed (29 Jul 2021 18:08)  Imitrex 100 mg oral tablet: 1 tab(s) orally once, As Needed (29 Jul 2021 18:08)  losartan 50 mg oral tablet: 1 tab(s) orally once a day (29 Jul 2021 18:08)  Zofran 4 mg oral tablet: 1 tab(s) orally every 8 hours, As Needed (29 Jul 2021 18:08)  zolpidem 10 mg oral tablet: 1 tab(s) orally once a day (at bedtime) (29 Jul 2021 18:08)    MEDICATIONS:  MEDICATIONS  (STANDING):  amLODIPine   Tablet 2.5 milliGRAM(s) Oral daily  citalopram 20 milliGRAM(s) Oral daily  heparin   Injectable 5000 Unit(s) SubCutaneous every 8 hours  losartan 50 milliGRAM(s) Oral daily  pantoprazole  Injectable 40 milliGRAM(s) IV Push every 12 hours  sodium chloride 0.9% lock flush 3 milliLiter(s) IV Push every 8 hours  sodium chloride 0.9%. 1000 milliLiter(s) (75 mL/Hr) IV Continuous <Continuous>    MEDICATIONS  (PRN):  aluminum hydroxide/magnesium hydroxide/simethicone Suspension 30 milliLiter(s) Oral every 4 hours PRN Dyspepsia  clonazePAM  Tablet 1 milliGRAM(s) Oral at bedtime PRN agitation/ insomnia  cyclobenzaprine 10 milliGRAM(s) Oral three times a day PRN Muscle Spasm  HYDROmorphone  Injectable 1 milliGRAM(s) IV Push every 4 hours PRN Moderate Pain (4 - 6)  ondansetron Injectable 4 milliGRAM(s) IV Push every 6 hours PRN Nausea and/or Vomiting  zolpidem 5 milliGRAM(s) Oral at bedtime PRN Insomnia    PAST MEDICAL & SURGICAL HISTORY:  HTN (hypertension)    HLD (hyperlipidemia)    History of COVID-19  1/2021    History of TIAs  2014    S/P cholecystectomy    History of repair of hiatal hernia  2010      FAMILY HISTORY:    SOCIAL HISTORY:  Tobacco: [ ] Current, [ ] Former, [ ] Never; Pack Years:  Alcohol:  Illicit Drugs:    REVIEW OF SYSTEMS:  CONSTITUTIONAL: No weakness, fevers or chills  HEENT: No visual changes; No vertigo or throat pain   NECK: No pain or stiffness  RESPIRATORY: No cough, wheezing, hemoptysis; No shortness of breath  CARDIOVASCULAR: No chest pain or palpitations  GASTROINTESTINAL: As above.  GENITOURINARY: No dysuria, frequency or hematuria  NEUROLOGICAL: No numbness or weakness  SKIN: No itching, burning, rashes, or lesions   All other 10 review of systems is negative unless indicated above.    Vital Signs Last 24 Hrs  T(C): 36.3 (30 Jul 2021 13:35), Max: 36.6 (30 Jul 2021 01:12)  T(F): 97.4 (30 Jul 2021 13:35), Max: 97.9 (30 Jul 2021 01:12)  HR: 72 (30 Jul 2021 14:07) (59 - 72)  BP: 150/80 (30 Jul 2021 14:07) (85/59 - 159/87)  BP(mean): 108 (30 Jul 2021 14:07) (84 - 113)  RR: 16 (30 Jul 2021 14:07) (16 - 20)  SpO2: 98% (30 Jul 2021 14:07) (92% - 98%)      PHYSICAL EXAM:    General: in no acute distress  Eyes: Anicteric sclerae, moist conjunctivae  HENT: Moist mucous membranes  Neck: Trachea midline, supple  Lungs: Normal respiratory effort, no intercostal retractions  Cardiovascular: RRR  Abdomen: Soft, non-tender non-distended; No rebound or guarding  Extremities: Normal range of motion, No clubbing, cyanosis or edema  Neurological: Alert and oriented x3  Skin: Warm and dry. No obvious rash    LABS:                        13.0   4.96  )-----------( 261      ( 30 Jul 2021 06:41 )             38.4     07-30    136  |  100  |  22  ----------------------------<  114<H>  4.6   |  26  |  0.77    Ca    9.6      30 Jul 2021 06:41  Mg     2.4     07-30    TPro  7.1  /  Alb  4.4  /  TBili  0.4  /  DBili  x   /  AST  45<H>  /  ALT  33  /  AlkPhos  147<H>  07-30        PT/INR - ( 29 Jul 2021 14:26 )   PT: 10.8 sec;   INR: 0.90          PTT - ( 29 Jul 2021 14:26 )  PTT:28.1 sec    RADIOLOGY & ADDITIONAL STUDIES:     Reviewed

## 2021-07-30 NOTE — PROGRESS NOTE ADULT - ASSESSMENT
66 year old female, former smoker, quit in January after developing Covid, with a PMHx of HTN, TIA 7 yrs ago, COVID 1/21, IBS, migraines, chronic neck and back pain, cholecystectomy, and hiatal hernia repair (2010), now with recurrent hernia. She presented to Dr. Baker office today to review esophagram and gastric emptying study. In office patient states she has been very nauseous with severe abdominal pain, vomiting multiple times during the day. Patient appears unwell and pale, referred to the ED For hydration, blood work, and CT Abdomen with IV/Oral contrast. Upon exam patient appears in discomfort, states pain on mildly controlled. Patient able to tolerate PO contrast. Patient otherwise denies dizziness, vision changes, chest pain, palpitations, shortness of breath, cough, n/v/d, extremity swelling, calf tenderness, sick contacts.     Neurovascular:   No delirium. Pain well controlled with current regimen.  -Tylenol PRN for pain    Cardiovascular:   Hemodynamically stable. HR controlled      Respiratory:   02 Sat = 98% on RA.  -Wean to RA from for O2 Sat > 93%.  -Encourage Cough, deep breathing and Use of IS 10x / hr while awake.  -Chest PT 4xdaily    GI:   PMHx Hiatial Hernia repair 2010 with recurrent herniation      - CT completed, final read as above  -protonix 40mg IB BID for GI protection  -IV Hydration  -NPO  -EGD 7/30/21: esophagitis    Renal / :   BUN/Cr Stable  -Continue to monitor I/O's.    Endocrine:    Blood sugar stable      Hematologic:  H/H stable  -DVT prophylaxis with Heparin sq    ID:  -Afebrile  -Continue to observe for SIRS/Sepsis Syndrome.    Disposition:  Telemetry

## 2021-07-30 NOTE — CHART NOTE - NSCHARTNOTEFT_GEN_A_CORE
Patient is s/p EGD w/ Dr. Urena in the Endoscopy Suite w/ the following findings and recommendations.     Impressions:  A large hiatal hernia was visualized at the distal esophagus. There was pooling of gastric contents refluxing back into the esophagus, with associated esophagitis. There was approximately 100 cc of fluid in the gastric fundus, that was suctioned out. There was some mild gastritis also visualized in the fundus. The procedure was subsequently aborted as patient became apneic and hypoxic.     Recommendations:  - Protonix 40 mg IV q 12 h   - NPO for 4 hrs, then if well, clear diet and advance as tolerated   - Chest X-Ray (PA/lateral)      Joanie Claudio MD  PGY-5, Gastroenterology Fellow  pager: [phone]

## 2021-07-31 LAB
ANION GAP SERPL CALC-SCNC: 14 MMOL/L — SIGNIFICANT CHANGE UP (ref 5–17)
BUN SERPL-MCNC: 14 MG/DL — SIGNIFICANT CHANGE UP (ref 7–23)
CALCIUM SERPL-MCNC: 9 MG/DL — SIGNIFICANT CHANGE UP (ref 8.4–10.5)
CHLORIDE SERPL-SCNC: 106 MMOL/L — SIGNIFICANT CHANGE UP (ref 96–108)
CO2 SERPL-SCNC: 17 MMOL/L — LOW (ref 22–31)
CREAT SERPL-MCNC: 0.64 MG/DL — SIGNIFICANT CHANGE UP (ref 0.5–1.3)
GLUCOSE SERPL-MCNC: 91 MG/DL — SIGNIFICANT CHANGE UP (ref 70–99)
HCT VFR BLD CALC: 37.1 % — SIGNIFICANT CHANGE UP (ref 34.5–45)
HGB BLD-MCNC: 12.5 G/DL — SIGNIFICANT CHANGE UP (ref 11.5–15.5)
MAGNESIUM SERPL-MCNC: 2.2 MG/DL — SIGNIFICANT CHANGE UP (ref 1.6–2.6)
MCHC RBC-ENTMCNC: 33.3 PG — SIGNIFICANT CHANGE UP (ref 27–34)
MCHC RBC-ENTMCNC: 33.7 GM/DL — SIGNIFICANT CHANGE UP (ref 32–36)
MCV RBC AUTO: 98.9 FL — SIGNIFICANT CHANGE UP (ref 80–100)
NRBC # BLD: 0 /100 WBCS — SIGNIFICANT CHANGE UP (ref 0–0)
PLATELET # BLD AUTO: 203 K/UL — SIGNIFICANT CHANGE UP (ref 150–400)
POTASSIUM SERPL-MCNC: 4.3 MMOL/L — SIGNIFICANT CHANGE UP (ref 3.5–5.3)
POTASSIUM SERPL-SCNC: 4.3 MMOL/L — SIGNIFICANT CHANGE UP (ref 3.5–5.3)
RBC # BLD: 3.75 M/UL — LOW (ref 3.8–5.2)
RBC # FLD: 13.1 % — SIGNIFICANT CHANGE UP (ref 10.3–14.5)
SODIUM SERPL-SCNC: 137 MMOL/L — SIGNIFICANT CHANGE UP (ref 135–145)
WBC # BLD: 4.33 K/UL — SIGNIFICANT CHANGE UP (ref 3.8–10.5)
WBC # FLD AUTO: 4.33 K/UL — SIGNIFICANT CHANGE UP (ref 3.8–10.5)

## 2021-07-31 PROCEDURE — 99231 SBSQ HOSP IP/OBS SF/LOW 25: CPT

## 2021-07-31 PROCEDURE — 71045 X-RAY EXAM CHEST 1 VIEW: CPT | Mod: 26

## 2021-07-31 RX ORDER — SUMATRIPTAN SUCCINATE 4 MG/.5ML
100 INJECTION, SOLUTION SUBCUTANEOUS ONCE
Refills: 0 | Status: COMPLETED | OUTPATIENT
Start: 2021-07-31 | End: 2021-07-31

## 2021-07-31 RX ADMIN — HYDROMORPHONE HYDROCHLORIDE 1 MILLIGRAM(S): 2 INJECTION INTRAMUSCULAR; INTRAVENOUS; SUBCUTANEOUS at 14:45

## 2021-07-31 RX ADMIN — LOSARTAN POTASSIUM 50 MILLIGRAM(S): 100 TABLET, FILM COATED ORAL at 05:36

## 2021-07-31 RX ADMIN — HEPARIN SODIUM 5000 UNIT(S): 5000 INJECTION INTRAVENOUS; SUBCUTANEOUS at 05:36

## 2021-07-31 RX ADMIN — SUMATRIPTAN SUCCINATE 100 MILLIGRAM(S): 4 INJECTION, SOLUTION SUBCUTANEOUS at 10:08

## 2021-07-31 RX ADMIN — HYDROMORPHONE HYDROCHLORIDE 1 MILLIGRAM(S): 2 INJECTION INTRAMUSCULAR; INTRAVENOUS; SUBCUTANEOUS at 19:23

## 2021-07-31 RX ADMIN — SODIUM CHLORIDE 75 MILLILITER(S): 9 INJECTION INTRAMUSCULAR; INTRAVENOUS; SUBCUTANEOUS at 00:32

## 2021-07-31 RX ADMIN — SODIUM CHLORIDE 3 MILLILITER(S): 9 INJECTION INTRAMUSCULAR; INTRAVENOUS; SUBCUTANEOUS at 22:34

## 2021-07-31 RX ADMIN — PANTOPRAZOLE SODIUM 40 MILLIGRAM(S): 20 TABLET, DELAYED RELEASE ORAL at 05:37

## 2021-07-31 RX ADMIN — AMLODIPINE BESYLATE 2.5 MILLIGRAM(S): 2.5 TABLET ORAL at 05:36

## 2021-07-31 RX ADMIN — ONDANSETRON 4 MILLIGRAM(S): 8 TABLET, FILM COATED ORAL at 16:57

## 2021-07-31 RX ADMIN — HYDROMORPHONE HYDROCHLORIDE 1 MILLIGRAM(S): 2 INJECTION INTRAMUSCULAR; INTRAVENOUS; SUBCUTANEOUS at 04:35

## 2021-07-31 RX ADMIN — HYDROMORPHONE HYDROCHLORIDE 1 MILLIGRAM(S): 2 INJECTION INTRAMUSCULAR; INTRAVENOUS; SUBCUTANEOUS at 19:35

## 2021-07-31 RX ADMIN — HYDROMORPHONE HYDROCHLORIDE 1 MILLIGRAM(S): 2 INJECTION INTRAMUSCULAR; INTRAVENOUS; SUBCUTANEOUS at 05:05

## 2021-07-31 RX ADMIN — ONDANSETRON 4 MILLIGRAM(S): 8 TABLET, FILM COATED ORAL at 22:44

## 2021-07-31 RX ADMIN — SODIUM CHLORIDE 3 MILLILITER(S): 9 INJECTION INTRAMUSCULAR; INTRAVENOUS; SUBCUTANEOUS at 13:43

## 2021-07-31 RX ADMIN — HYDROMORPHONE HYDROCHLORIDE 1 MILLIGRAM(S): 2 INJECTION INTRAMUSCULAR; INTRAVENOUS; SUBCUTANEOUS at 14:30

## 2021-07-31 RX ADMIN — HYDROMORPHONE HYDROCHLORIDE 1 MILLIGRAM(S): 2 INJECTION INTRAMUSCULAR; INTRAVENOUS; SUBCUTANEOUS at 10:25

## 2021-07-31 RX ADMIN — SODIUM CHLORIDE 3 MILLILITER(S): 9 INJECTION INTRAMUSCULAR; INTRAVENOUS; SUBCUTANEOUS at 05:37

## 2021-07-31 RX ADMIN — HYDROMORPHONE HYDROCHLORIDE 1 MILLIGRAM(S): 2 INJECTION INTRAMUSCULAR; INTRAVENOUS; SUBCUTANEOUS at 10:10

## 2021-07-31 RX ADMIN — HEPARIN SODIUM 5000 UNIT(S): 5000 INJECTION INTRAVENOUS; SUBCUTANEOUS at 14:29

## 2021-07-31 RX ADMIN — PANTOPRAZOLE SODIUM 40 MILLIGRAM(S): 20 TABLET, DELAYED RELEASE ORAL at 18:41

## 2021-07-31 RX ADMIN — SUMATRIPTAN SUCCINATE 100 MILLIGRAM(S): 4 INJECTION, SOLUTION SUBCUTANEOUS at 09:08

## 2021-07-31 RX ADMIN — HEPARIN SODIUM 5000 UNIT(S): 5000 INJECTION INTRAVENOUS; SUBCUTANEOUS at 22:33

## 2021-07-31 RX ADMIN — CITALOPRAM 20 MILLIGRAM(S): 10 TABLET, FILM COATED ORAL at 11:48

## 2021-07-31 RX ADMIN — ONDANSETRON 4 MILLIGRAM(S): 8 TABLET, FILM COATED ORAL at 10:10

## 2021-07-31 NOTE — PROGRESS NOTE ADULT - ASSESSMENT
66 year old female, former smoker, quit in January after developing Covid, with a PMHx of HTN, TIA 7yrs ago, COVID 1/21, IBS, migraines, chronic neck and back pain, cholecystectomy, and hiatal hernia repair (2010), now with recurrent hernia. She presented to Dr. Baker office today to review esophagram and gastric emptying study. In office patient states she has been very nauseous with severe abdominal pain, vomiting multiple times during the day. Patient appears unwell and pale, referred to the ED For hydration, blood work, and CT Abdomen with IV/Oral contrast.     Neurovascular:   No delirium. Pain well controlled with current regimen.  -Tylenol PRN for pain    Cardiovascular:   Hemodynamically stable. HR controlled    Respiratory:   02 Sat = 98% on RA.  -Wean to RA from for O2 Sat > 93%.  -Encourage Cough, deep breathing and Use of IS 10x / hr while awake.  -Chest PT 4xdaily    GI:   PMHx Hiatial Hernia repair 2010 with recurrent herniation      - CT completed, final read as above  -protonix 40mg IB BID for GI protection  -IV Hydration  -NPO  -EGD 7/30/21: esophagitis    Renal / :   BUN/Cr Stable: 14/0.64  -Continue to monitor I/O's.    Endocrine:    Blood sugar stable      Hematologic:  H/H stable: 12.5/37.1  -DVT prophylaxis with Heparin sq    ID:  -Afebrile  -WBC 4.33  -Continue to observe for SIRS/Sepsis Syndrome.    Disposition:  Telemetry

## 2021-07-31 NOTE — PROGRESS NOTE ADULT - SUBJECTIVE AND OBJECTIVE BOX
Patient discussed on morning rounds with Dr. Baker      Reason for Admission: Hiatal Hernia     SUBJECTIVE ASSESSMENT:  66y Female. NAEO. EGD c/b c/f aspiration. Patient c/o nausea and pain with movement. Patient does not want to ambulate or move out of bed. Patient otherwise denies sob, palpitations, n/v/d/c.    Vital Signs Last 24 Hrs  T(C): 36.6 (31 Jul 2021 17:35), Max: 36.6 (30 Jul 2021 21:42)  T(F): 97.8 (31 Jul 2021 17:35), Max: 97.8 (30 Jul 2021 21:42)  HR: 83 (31 Jul 2021 13:00) (70 - 83)  BP: 153/67 (31 Jul 2021 13:00) (109/59 - 153/67)  BP(mean): 97 (31 Jul 2021 13:00) (81 - 97)  RR: 20 (31 Jul 2021 13:00) (16 - 21)  SpO2: 97% (31 Jul 2021 13:00) (93% - 98%)  I&O's Detail    30 Jul 2021 07:01  -  31 Jul 2021 07:00  --------------------------------------------------------  IN:    sodium chloride 0.9%: 825 mL  Total IN: 825 mL    OUT:    Voided (mL): 500 mL  Total OUT: 500 mL    Total NET: 325 mL      31 Jul 2021 07:01  -  31 Jul 2021 18:08  --------------------------------------------------------  IN:    Oral Fluid: 120 mL    sodium chloride 0.9%: 525 mL  Total IN: 645 mL    OUT:  Total OUT: 0 mL    Total NET: 645 mL      CHEST TUBE:  No.    ANTHONY DRAIN:   No.  EPICARDIAL WIRES:  No.  TIE DOWNS:  No.  POSADAS: No.    PHYSICAL EXAM:  GEN: NAD, looks comfortable  Psych: Mood appropriate  Neuro: A&Ox3.  No focal deficits.  Moving all extremities.   HEENT: No obvious abnormalities  CV: S1S2, regular, no murmurs appreciated.  No carotid bruits.  No JVD  Lungs: Clear B/L.  No wheezing, rales or rhonchi  ABD: Soft, non-tender, non-distended.  +Bowel sounds  EXT: Warm and well perfused.  No peripheral edema noted  Musculoskeletal: Moving all extremities with normal ROM, no joint swelling  PV: Pedal pulses palpable    LABS:                        12.5   4.33  )-----------( 203      ( 31 Jul 2021 05:54 )             37.1       COUMADIN:  No.     07-31    137  |  106  |  14  ----------------------------<  91  4.3   |  17<L>  |  0.64    Ca    9.0      31 Jul 2021 05:52  Mg     2.2     07-31    TPro  7.1  /  Alb  4.4  /  TBili  0.4  /  DBili  x   /  AST  45<H>  /  ALT  33  /  AlkPhos  147<H>  07-30      MEDICATIONS  (STANDING):  amLODIPine   Tablet 2.5 milliGRAM(s) Oral daily  citalopram 20 milliGRAM(s) Oral daily  heparin   Injectable 5000 Unit(s) SubCutaneous every 8 hours  losartan 50 milliGRAM(s) Oral daily  pantoprazole  Injectable 40 milliGRAM(s) IV Push every 12 hours  sodium chloride 0.9% lock flush 3 milliLiter(s) IV Push every 8 hours  sodium chloride 0.9%. 1000 milliLiter(s) (75 mL/Hr) IV Continuous <Continuous>    MEDICATIONS  (PRN):  aluminum hydroxide/magnesium hydroxide/simethicone Suspension 30 milliLiter(s) Oral every 4 hours PRN Dyspepsia  clonazePAM  Tablet 1 milliGRAM(s) Oral at bedtime PRN agitation/ insomnia  cyclobenzaprine 10 milliGRAM(s) Oral three times a day PRN Muscle Spasm  HYDROmorphone  Injectable 1 milliGRAM(s) IV Push every 4 hours PRN Moderate Pain (4 - 6)  ondansetron Injectable 4 milliGRAM(s) IV Push every 6 hours PRN Nausea and/or Vomiting  zolpidem 5 milliGRAM(s) Oral at bedtime PRN Insomnia        RADIOLOGY & ADDITIONAL TESTS:  < from: Xray Chest 1 View- PORTABLE-Routine (Xray Chest 1 View- PORTABLE-Routine in AM.) (07.31.21 @ 04:54) >  INTERPRETATION:  Clinical History: Postop    Frontal examination of the chest demonstrates the heart to be within normal limits in transverse diameter. No rates. Residual contrast noted left upper abdomen. Mild degenerative changes thoracic spine.    IMPRESSION: No acute infiltrates    < end of copied text >

## 2021-07-31 NOTE — HISTORY OF PRESENT ILLNESS
[FreeTextEntry1] : 66 year old female, former smoker, quit in January after developing Covid, with a PMHx of HTN, TIA 7 yrs ago, COVID 1/21, IBS, migraines, chronic neck and back pain, cholecystectomy, and hiatal hernia repair (2010), now with recurrent hernia. She presents today to review esophagram and gastric emptying study. \par \par CT chest/abdomen completed on 06/04/21:\par -small hiatal hernia\par -mild intrahepatic bile duct dilation on the basis of cholecystomy\par -tiny umbilical hernia \par \par EGD completed on 06/07/21:\par -large recurrence of her paraesophageal hernia in a type 2 fashion with a large paraesophageal hernia in a type 2 fashion with a large paraesophageal component with some extrinsic compression on the esophagus \par \par gastric emptying study completed on 7/16/21:\par -Normal gastric emptying study\par \par Barium esophagram completed on ***\par ***

## 2021-07-31 NOTE — ASSESSMENT
[FreeTextEntry1] : 66 year old female, former smoker, quit in January after developing Covid, with a PMHx of HTN, TIA 7 yrs ago, COVID 1/21, IBS, migraines, chronic neck and back pain, cholecystectomy, and hiatal hernia repair (2010), now with recurrent hernia. She presents today to review esophagram and gastric emptying study. \par \par Patient admits to feeling nauseous, weakness and vomiting for the past few days. On physical exam her mucous membranes appear dry and is likely dehydrated. \par \par Barium esophagram was reviewed and revealed partial herniation of the fundoplication wrap. I explained that ultimately her hernia needs to be repaired, however at this time will admit to the ER for hydration and r/o infectious cause of abdominal pain. \par \par Plan:\par 1. Admit to the ER for IV hydration and CT abdomen/pelvis with PO and oral contrast\par \par I, BOBBY TAYLOR , am scribing for and in the presence of [Dr. Jacky Baker] the following sections: History of present illness, past Medical/family/surgical/family/social history, review of systems, vital signs, physical exam and disposition.\par  \par There is no evidence of acute abdomen on physical exam, but the patient has intractable nausea with emesis, and will require emergent evaluation.  I do not see any evidence of obstruction on current imaging.  Will admit and have gastroenterology evaluate for endoscopy.

## 2021-07-31 NOTE — PROGRESS NOTE ADULT - ASSESSMENT
66 year old female, former smoker, quit in January after developing Covid, with a PMHx of HTN, TIA 7 yrs ago, COVID 1/21, IBS, migraines, chronic neck and back pain, cholecystectomy, and hiatal hernia repair (2010), now with recurrent hernia.     #Nausea, vomiting, hiatal hernia  - s/p EGD: large hiatal hernia was visualized at the distal esophagus. There was pooling of gastric contents refluxing back into the esophagus, with associated esophagitis. There was approximately 100 cc of fluid in the gastric fundus, that was suctioned out. There was some mild gastritis also visualized in the fundus. The procedure was subsequently aborted as patient became apneic and hypoxic.   - Protonix 40 mg IV q 12 h   - patient's symptoms are most likely 2/2 recurrence of hiatal hernia; the inflammation in her esophagus and stomach are more likely sequela of the HH as opposed to the etiology of her n/v   - will d/w CT surgery team if value in repeating endoscopy to complete evaluation of upper GI tract     Joanie Claudio MD  PGY-5, Gastroenterology Fellow  pager: [phone]

## 2021-07-31 NOTE — PROGRESS NOTE ADULT - SUBJECTIVE AND OBJECTIVE BOX
GASTROENTEROLOGY PROGRESS NOTE  Patient seen and examined at bedside. Still having episodes of nausea and epigastric/chest pain.     PERTINENT REVIEW OF SYSTEMS:  CONSTITUTIONAL: No weakness, fevers or chills  HEENT: No visual changes; No vertigo or throat pain   GASTROINTESTINAL: As above.  NEUROLOGICAL: No numbness or weakness  SKIN: No itching, burning, rashes, or lesions     Allergies    penicillin (Unknown)    Intolerances      MEDICATIONS:  MEDICATIONS  (STANDING):  amLODIPine   Tablet 2.5 milliGRAM(s) Oral daily  citalopram 20 milliGRAM(s) Oral daily  heparin   Injectable 5000 Unit(s) SubCutaneous every 8 hours  losartan 50 milliGRAM(s) Oral daily  pantoprazole  Injectable 40 milliGRAM(s) IV Push every 12 hours  sodium chloride 0.9% lock flush 3 milliLiter(s) IV Push every 8 hours  sodium chloride 0.9%. 1000 milliLiter(s) (75 mL/Hr) IV Continuous <Continuous>    MEDICATIONS  (PRN):  aluminum hydroxide/magnesium hydroxide/simethicone Suspension 30 milliLiter(s) Oral every 4 hours PRN Dyspepsia  clonazePAM  Tablet 1 milliGRAM(s) Oral at bedtime PRN agitation/ insomnia  cyclobenzaprine 10 milliGRAM(s) Oral three times a day PRN Muscle Spasm  HYDROmorphone  Injectable 1 milliGRAM(s) IV Push every 4 hours PRN Moderate Pain (4 - 6)  ondansetron Injectable 4 milliGRAM(s) IV Push every 6 hours PRN Nausea and/or Vomiting  zolpidem 5 milliGRAM(s) Oral at bedtime PRN Insomnia    Vital Signs Last 24 Hrs  T(C): 36.4 (31 Jul 2021 09:25), Max: 36.6 (30 Jul 2021 21:42)  T(F): 97.5 (31 Jul 2021 09:25), Max: 97.8 (30 Jul 2021 21:42)  HR: 83 (31 Jul 2021 13:00) (70 - 83)  BP: 153/67 (31 Jul 2021 13:00) (109/59 - 153/67)  BP(mean): 97 (31 Jul 2021 13:00) (81 - 97)  RR: 20 (31 Jul 2021 13:00) (16 - 21)  SpO2: 97% (31 Jul 2021 13:00) (93% - 98%)    07-30 @ 07:01  -  07-31 @ 07:00  --------------------------------------------------------  IN: 825 mL / OUT: 500 mL / NET: 325 mL    07-31 @ 07:01 - 07-31 @ 17:12  --------------------------------------------------------  IN: 645 mL / OUT: 0 mL / NET: 645 mL      PHYSICAL EXAM:    General: in no acute distress  HEENT: MMM, conjunctiva and sclera clear  Gastrointestinal: Soft non-tender non-distended; No rebound or guarding  Skin: Warm and dry. No obvious rash    LABS:                        12.5   4.33  )-----------( 203 ( 31 Jul 2021 05:54 )             37.1     07-31    137  |  106  |  14  ----------------------------<  91  4.3   |  17<L>  |  0.64    Ca    9.0      31 Jul 2021 05:52  Mg     2.2     07-31    TPro  7.1  /  Alb  4.4  /  TBili  0.4  /  DBili  x   /  AST  45<H>  /  ALT  33  /  AlkPhos  147<H>  07-30                      RADIOLOGY & ADDITIONAL STUDIES:  Reviewed

## 2021-08-01 LAB
ALBUMIN SERPL ELPH-MCNC: 4.2 G/DL — SIGNIFICANT CHANGE UP (ref 3.3–5)
ALP SERPL-CCNC: 145 U/L — HIGH (ref 40–120)
ALT FLD-CCNC: 22 U/L — SIGNIFICANT CHANGE UP (ref 10–45)
ANION GAP SERPL CALC-SCNC: 16 MMOL/L — SIGNIFICANT CHANGE UP (ref 5–17)
AST SERPL-CCNC: 24 U/L — SIGNIFICANT CHANGE UP (ref 10–40)
BILIRUB SERPL-MCNC: 0.4 MG/DL — SIGNIFICANT CHANGE UP (ref 0.2–1.2)
BUN SERPL-MCNC: 9 MG/DL — SIGNIFICANT CHANGE UP (ref 7–23)
CALCIUM SERPL-MCNC: 9.7 MG/DL — SIGNIFICANT CHANGE UP (ref 8.4–10.5)
CHLORIDE SERPL-SCNC: 102 MMOL/L — SIGNIFICANT CHANGE UP (ref 96–108)
CO2 SERPL-SCNC: 18 MMOL/L — LOW (ref 22–31)
CREAT SERPL-MCNC: 0.62 MG/DL — SIGNIFICANT CHANGE UP (ref 0.5–1.3)
GLUCOSE SERPL-MCNC: 66 MG/DL — LOW (ref 70–99)
HCT VFR BLD CALC: 41 % — SIGNIFICANT CHANGE UP (ref 34.5–45)
HGB BLD-MCNC: 13.8 G/DL — SIGNIFICANT CHANGE UP (ref 11.5–15.5)
MAGNESIUM SERPL-MCNC: 2.2 MG/DL — SIGNIFICANT CHANGE UP (ref 1.6–2.6)
MCHC RBC-ENTMCNC: 33.1 PG — SIGNIFICANT CHANGE UP (ref 27–34)
MCHC RBC-ENTMCNC: 33.7 GM/DL — SIGNIFICANT CHANGE UP (ref 32–36)
MCV RBC AUTO: 98.3 FL — SIGNIFICANT CHANGE UP (ref 80–100)
NRBC # BLD: 0 /100 WBCS — SIGNIFICANT CHANGE UP (ref 0–0)
PLATELET # BLD AUTO: 215 K/UL — SIGNIFICANT CHANGE UP (ref 150–400)
POTASSIUM SERPL-MCNC: 4.3 MMOL/L — SIGNIFICANT CHANGE UP (ref 3.5–5.3)
POTASSIUM SERPL-SCNC: 4.3 MMOL/L — SIGNIFICANT CHANGE UP (ref 3.5–5.3)
PROT SERPL-MCNC: 7 G/DL — SIGNIFICANT CHANGE UP (ref 6–8.3)
RBC # BLD: 4.17 M/UL — SIGNIFICANT CHANGE UP (ref 3.8–5.2)
RBC # FLD: 13 % — SIGNIFICANT CHANGE UP (ref 10.3–14.5)
SODIUM SERPL-SCNC: 136 MMOL/L — SIGNIFICANT CHANGE UP (ref 135–145)
WBC # BLD: 6.26 K/UL — SIGNIFICANT CHANGE UP (ref 3.8–10.5)
WBC # FLD AUTO: 6.26 K/UL — SIGNIFICANT CHANGE UP (ref 3.8–10.5)

## 2021-08-01 PROCEDURE — 70450 CT HEAD/BRAIN W/O DYE: CPT | Mod: 26

## 2021-08-01 PROCEDURE — 71045 X-RAY EXAM CHEST 1 VIEW: CPT | Mod: 26

## 2021-08-01 PROCEDURE — 99232 SBSQ HOSP IP/OBS MODERATE 35: CPT

## 2021-08-01 RX ORDER — ONDANSETRON 8 MG/1
4 TABLET, FILM COATED ORAL EVERY 6 HOURS
Refills: 0 | Status: DISCONTINUED | OUTPATIENT
Start: 2021-08-01 | End: 2021-08-04

## 2021-08-01 RX ORDER — ACETAMINOPHEN 500 MG
1000 TABLET ORAL ONCE
Refills: 0 | Status: COMPLETED | OUTPATIENT
Start: 2021-08-01 | End: 2021-08-01

## 2021-08-01 RX ADMIN — Medication 1000 MILLIGRAM(S): at 13:25

## 2021-08-01 RX ADMIN — HEPARIN SODIUM 5000 UNIT(S): 5000 INJECTION INTRAVENOUS; SUBCUTANEOUS at 06:09

## 2021-08-01 RX ADMIN — Medication 1000 MILLIGRAM(S): at 22:41

## 2021-08-01 RX ADMIN — HEPARIN SODIUM 5000 UNIT(S): 5000 INJECTION INTRAVENOUS; SUBCUTANEOUS at 14:55

## 2021-08-01 RX ADMIN — HYDROMORPHONE HYDROCHLORIDE 1 MILLIGRAM(S): 2 INJECTION INTRAMUSCULAR; INTRAVENOUS; SUBCUTANEOUS at 07:50

## 2021-08-01 RX ADMIN — ZOLPIDEM TARTRATE 5 MILLIGRAM(S): 10 TABLET ORAL at 22:04

## 2021-08-01 RX ADMIN — ONDANSETRON 4 MILLIGRAM(S): 8 TABLET, FILM COATED ORAL at 06:09

## 2021-08-01 RX ADMIN — HYDROMORPHONE HYDROCHLORIDE 1 MILLIGRAM(S): 2 INJECTION INTRAMUSCULAR; INTRAVENOUS; SUBCUTANEOUS at 07:39

## 2021-08-01 RX ADMIN — ONDANSETRON 4 MILLIGRAM(S): 8 TABLET, FILM COATED ORAL at 12:55

## 2021-08-01 RX ADMIN — SODIUM CHLORIDE 3 MILLILITER(S): 9 INJECTION INTRAMUSCULAR; INTRAVENOUS; SUBCUTANEOUS at 14:55

## 2021-08-01 RX ADMIN — Medication 400 MILLIGRAM(S): at 12:55

## 2021-08-01 RX ADMIN — PANTOPRAZOLE SODIUM 40 MILLIGRAM(S): 20 TABLET, DELAYED RELEASE ORAL at 18:15

## 2021-08-01 RX ADMIN — PANTOPRAZOLE SODIUM 40 MILLIGRAM(S): 20 TABLET, DELAYED RELEASE ORAL at 06:08

## 2021-08-01 RX ADMIN — SODIUM CHLORIDE 3 MILLILITER(S): 9 INJECTION INTRAMUSCULAR; INTRAVENOUS; SUBCUTANEOUS at 06:10

## 2021-08-01 RX ADMIN — HEPARIN SODIUM 5000 UNIT(S): 5000 INJECTION INTRAVENOUS; SUBCUTANEOUS at 22:15

## 2021-08-01 RX ADMIN — ONDANSETRON 4 MILLIGRAM(S): 8 TABLET, FILM COATED ORAL at 18:15

## 2021-08-01 RX ADMIN — SODIUM CHLORIDE 75 MILLILITER(S): 9 INJECTION INTRAMUSCULAR; INTRAVENOUS; SUBCUTANEOUS at 09:00

## 2021-08-01 RX ADMIN — HYDROMORPHONE HYDROCHLORIDE 1 MILLIGRAM(S): 2 INJECTION INTRAMUSCULAR; INTRAVENOUS; SUBCUTANEOUS at 01:10

## 2021-08-01 RX ADMIN — Medication 400 MILLIGRAM(S): at 22:11

## 2021-08-01 RX ADMIN — SODIUM CHLORIDE 75 MILLILITER(S): 9 INJECTION INTRAMUSCULAR; INTRAVENOUS; SUBCUTANEOUS at 22:13

## 2021-08-01 RX ADMIN — Medication 1 MILLIGRAM(S): at 16:53

## 2021-08-01 RX ADMIN — HYDROMORPHONE HYDROCHLORIDE 1 MILLIGRAM(S): 2 INJECTION INTRAMUSCULAR; INTRAVENOUS; SUBCUTANEOUS at 01:26

## 2021-08-01 RX ADMIN — SODIUM CHLORIDE 3 MILLILITER(S): 9 INJECTION INTRAMUSCULAR; INTRAVENOUS; SUBCUTANEOUS at 21:02

## 2021-08-01 NOTE — PROGRESS NOTE ADULT - SUBJECTIVE AND OBJECTIVE BOX
Patient discussed on morning rounds with Dr. Baker     Operation / Date: hiatal hernia    SUBJECTIVE ASSESSMENT:  66y Female. C/o emesis overnight. Patient refusing medications, refusing trialing clear liquid diet, asking for zofran and pain medications. Stopped all IV narcotics. GI signing off on patient, attributing symptoms to hernia. Patient amiable in room, laughing with family, and simultaneously endorsing panic attack.    Vital Signs Last 24 Hrs  T(C): 36.7 (01 Aug 2021 17:38), Max: 36.8 (01 Aug 2021 14:12)  T(F): 98.1 (01 Aug 2021 17:38), Max: 98.3 (01 Aug 2021 14:12)  HR: 94 (01 Aug 2021 16:35) (84 - 96)  BP: 138/86 (01 Aug 2021 16:35) (125/68 - 141/90)  BP(mean): 106 (01 Aug 2021 16:35) (94 - 107)  RR: 19 (01 Aug 2021 16:35) (18 - 20)  SpO2: 96% (01 Aug 2021 16:35) (95% - 97%)  I&O's Detail    31 Jul 2021 07:01  -  01 Aug 2021 07:00  --------------------------------------------------------  IN:    Oral Fluid: 360 mL    sodium chloride 0.9%: 525 mL  Total IN: 885 mL    OUT:    Blood Loss (mL): 120 mL    Voided (mL): 600 mL  Total OUT: 720 mL    Total NET: 165 mL      01 Aug 2021 07:01  -  01 Aug 2021 18:19  --------------------------------------------------------  IN:    IV PiggyBack: 100 mL    sodium chloride 0.9%: 750 mL  Total IN: 850 mL    OUT:    Voided (mL): 650 mL  Total OUT: 650 mL    Total NET: 200 mL    CHEST TUBE:  No.   ANTHONY DRAIN:  No.  EPICARDIAL WIRES: No.  TIE DOWNS: No.  POSADAS: No.    PHYSICAL EXAM:  GEN: NAD, looks comfortable  Psych: Mood appropriate  Neuro: A&Ox3.  No focal deficits.  Moving all extremities.   HEENT: No obvious abnormalities  CV: S1S2, regular, no murmurs appreciated.  No carotid bruits.  No JVD  Lungs: Clear B/L.  No wheezing, rales or rhonchi  ABD: Soft, non-tender, non-distended.  +Bowel sounds  EXT: Warm and well perfused.  No peripheral edema noted  Musculoskeletal: Moving all extremities with normal ROM, no joint swelling  PV: Pedal pulses palpable    LABS:                        13.8   6.26  )-----------( 215      ( 01 Aug 2021 07:00 )             41.0     COUMADIN: No.     08-01    136  |  102  |  9   ----------------------------<  66<L>  4.3   |  18<L>  |  0.62    Ca    9.7      01 Aug 2021 07:00  Mg     2.2     08-01    TPro  7.0  /  Alb  4.2  /  TBili  0.4  /  DBili  x   /  AST  24  /  ALT  22  /  AlkPhos  145<H>  08-01      MEDICATIONS  (STANDING):  amLODIPine   Tablet 2.5 milliGRAM(s) Oral daily  citalopram 20 milliGRAM(s) Oral daily  heparin   Injectable 5000 Unit(s) SubCutaneous every 8 hours  losartan 50 milliGRAM(s) Oral daily  ondansetron Injectable 4 milliGRAM(s) IV Push every 6 hours  pantoprazole  Injectable 40 milliGRAM(s) IV Push every 12 hours  sodium chloride 0.9% lock flush 3 milliLiter(s) IV Push every 8 hours  sodium chloride 0.9%. 1000 milliLiter(s) (75 mL/Hr) IV Continuous <Continuous>    MEDICATIONS  (PRN):  aluminum hydroxide/magnesium hydroxide/simethicone Suspension 30 milliLiter(s) Oral every 4 hours PRN Dyspepsia  clonazePAM  Tablet 1 milliGRAM(s) Oral at bedtime PRN agitation/ insomnia  cyclobenzaprine 10 milliGRAM(s) Oral three times a day PRN Muscle Spasm  zolpidem 5 milliGRAM(s) Oral at bedtime PRN Insomnia        RADIOLOGY & ADDITIONAL TESTS:    < from: CT Head No Cont (08.01.21 @ 10:42) >    FINDINGS: The CT examination demonstrates the ventricles, cisternal spaces, and cortical sulci to be within normal limits. There is no midline shift or extra axial collections. The gray white differentiation appears within normal limits. There is no intracranial hemorrhage or acute transcortical infarct. There is a approximately 11 mm lesion which is predominantly calcified along the right posterior frontal convexity which may represent a meningioma (series 4 image 28 and series 17426 image 38).    The bony windows demonstrates no fractures. The visualized paranasal sinuses are within normal limits. The right mastoid air cells are underdeveloped. The left mastoid air cells are well aerated.    Limited evaluation of the TMJs demonstrates degenerative changes with joint space narrowing with condylar head flattening (right greater than left).    IMPRESSION: Approximately 11 mm right frontal convexity calcified meningioma.    --- End of Report ---    < end of copied text >   Patient discussed on morning rounds with Dr. Baker     Operation / Date: hiatal hernia    SUBJECTIVE ASSESSMENT:  66y Female. C/o emesis overnight. Patient refusing medications, refusing trialing clear liquid diet, asking for zofran and pain medications. Stopped all IV narcotics. GI signing off on patient, attributing symptoms to hernia. Patient amiable in room, laughing with family, and simultaneously endorsing panic attack.    Vital Signs Last 24 Hrs  T(C): 36.7 (01 Aug 2021 17:38), Max: 36.8 (01 Aug 2021 14:12)  T(F): 98.1 (01 Aug 2021 17:38), Max: 98.3 (01 Aug 2021 14:12)  HR: 94 (01 Aug 2021 16:35) (84 - 96)  BP: 138/86 (01 Aug 2021 16:35) (125/68 - 141/90)  BP(mean): 106 (01 Aug 2021 16:35) (94 - 107)  RR: 19 (01 Aug 2021 16:35) (18 - 20)  SpO2: 96% (01 Aug 2021 16:35) (95% - 97%)  I&O's Detail    31 Jul 2021 07:01  -  01 Aug 2021 07:00  --------------------------------------------------------  IN:    Oral Fluid: 360 mL    sodium chloride 0.9%: 525 mL  Total IN: 885 mL    OUT:    Blood Loss (mL): 120 mL    Voided (mL): 600 mL  Total OUT: 720 mL    Total NET: 165 mL      01 Aug 2021 07:01  -  01 Aug 2021 18:19  --------------------------------------------------------  IN:    IV PiggyBack: 100 mL    sodium chloride 0.9%: 750 mL  Total IN: 850 mL    OUT:    Voided (mL): 650 mL  Total OUT: 650 mL    Total NET: 200 mL    CHEST TUBE:  No.   ANTHONY DRAIN:  No.  EPICARDIAL WIRES: No.  TIE DOWNS: No.  POSADAS: No.    PHYSICAL EXAM:  GEN: NAD, looks comfortable  Psych: Mood appropriate  Neuro: A&Ox3.  No focal deficits.  Moving all extremities.   HEENT: No obvious abnormalities  CV: S1S2, regular, no murmurs appreciated.  No carotid bruits.  No JVD  Lungs: Clear B/L.  No wheezing, rales or rhonchi  ABD: Soft, non-tender, non-distended.  +Bowel sounds  EXT: Warm and well perfused.  No peripheral edema noted  Musculoskeletal: Moving all extremities with normal ROM, no joint swelling  PV: Pedal pulses palpable    LABS:                        13.8   6.26  )-----------( 215      ( 01 Aug 2021 07:00 )             41.0     COUMADIN: No.     08-01    136  |  102  |  9   ----------------------------<  66<L>  4.3   |  18<L>  |  0.62    Ca    9.7      01 Aug 2021 07:00  Mg     2.2     08-01    TPro  7.0  /  Alb  4.2  /  TBili  0.4  /  DBili  x   /  AST  24  /  ALT  22  /  AlkPhos  145<H>  08-01      MEDICATIONS  (STANDING):  amLODIPine   Tablet 2.5 milliGRAM(s) Oral daily  citalopram 20 milliGRAM(s) Oral daily  heparin   Injectable 5000 Unit(s) SubCutaneous every 8 hours  losartan 50 milliGRAM(s) Oral daily  ondansetron Injectable 4 milliGRAM(s) IV Push every 6 hours  pantoprazole  Injectable 40 milliGRAM(s) IV Push every 12 hours  sodium chloride 0.9% lock flush 3 milliLiter(s) IV Push every 8 hours  sodium chloride 0.9%. 1000 milliLiter(s) (75 mL/Hr) IV Continuous <Continuous>    MEDICATIONS  (PRN):  aluminum hydroxide/magnesium hydroxide/simethicone Suspension 30 milliLiter(s) Oral every 4 hours PRN Dyspepsia  clonazePAM  Tablet 1 milliGRAM(s) Oral at bedtime PRN agitation/ insomnia  cyclobenzaprine 10 milliGRAM(s) Oral three times a day PRN Muscle Spasm  zolpidem 5 milliGRAM(s) Oral at bedtime PRN Insomnia      RADIOLOGY & ADDITIONAL TESTS:    < from: CT Head No Cont (08.01.21 @ 10:42) >    FINDINGS: The CT examination demonstrates the ventricles, cisternal spaces, and cortical sulci to be within normal limits. There is no midline shift or extra axial collections. The gray white differentiation appears within normal limits. There is no intracranial hemorrhage or acute transcortical infarct. There is a approximately 11 mm lesion which is predominantly calcified along the right posterior frontal convexity which may represent a meningioma (series 4 image 28 and series 87374 image 38).    The bony windows demonstrates no fractures. The visualized paranasal sinuses are within normal limits. The right mastoid air cells are underdeveloped. The left mastoid air cells are well aerated.    Limited evaluation of the TMJs demonstrates degenerative changes with joint space narrowing with condylar head flattening (right greater than left).    IMPRESSION: Approximately 11 mm right frontal convexity calcified meningioma.    --- End of Report ---    < end of copied text >

## 2021-08-01 NOTE — PROGRESS NOTE ADULT - SUBJECTIVE AND OBJECTIVE BOX
GASTROENTEROLOGY PROGRESS NOTE  Patient seen and examined at bedside. Continues to have nausea, less emesis, though she is no longer drinking liquids as much.     PERTINENT REVIEW OF SYSTEMS:  CONSTITUTIONAL: No weakness, fevers or chills  HEENT: No visual changes; No vertigo or throat pain   GASTROINTESTINAL: As above.  NEUROLOGICAL: No numbness or weakness  SKIN: No itching, burning, rashes, or lesions     Allergies    penicillin (Unknown)    Intolerances      MEDICATIONS:  MEDICATIONS  (STANDING):  amLODIPine   Tablet 2.5 milliGRAM(s) Oral daily  citalopram 20 milliGRAM(s) Oral daily  heparin   Injectable 5000 Unit(s) SubCutaneous every 8 hours  losartan 50 milliGRAM(s) Oral daily  ondansetron Injectable 4 milliGRAM(s) IV Push every 6 hours  pantoprazole  Injectable 40 milliGRAM(s) IV Push every 12 hours  sodium chloride 0.9% lock flush 3 milliLiter(s) IV Push every 8 hours  sodium chloride 0.9%. 1000 milliLiter(s) (75 mL/Hr) IV Continuous <Continuous>    MEDICATIONS  (PRN):  aluminum hydroxide/magnesium hydroxide/simethicone Suspension 30 milliLiter(s) Oral every 4 hours PRN Dyspepsia  clonazePAM  Tablet 1 milliGRAM(s) Oral at bedtime PRN agitation/ insomnia  cyclobenzaprine 10 milliGRAM(s) Oral three times a day PRN Muscle Spasm  zolpidem 5 milliGRAM(s) Oral at bedtime PRN Insomnia    Vital Signs Last 24 Hrs  T(C): 36.8 (01 Aug 2021 14:12), Max: 36.8 (01 Aug 2021 14:12)  T(F): 98.3 (01 Aug 2021 14:12), Max: 98.3 (01 Aug 2021 14:12)  HR: 88 (01 Aug 2021 12:30) (76 - 96)  BP: 128/72 (01 Aug 2021 12:30) (106/56 - 141/90)  BP(mean): 94 (01 Aug 2021 12:30) (75 - 107)  RR: 20 (01 Aug 2021 12:30) (18 - 20)  SpO2: 97% (01 Aug 2021 12:30) (95% - 97%)    07-31 @ 07:01  -  08-01 @ 07:00  --------------------------------------------------------  IN: 885 mL / OUT: 720 mL / NET: 165 mL    08-01 @ 07:01  -  08-01 @ 15:07  --------------------------------------------------------  IN: 625 mL / OUT: 650 mL / NET: -25 mL      PHYSICAL EXAM:    General: in no acute distress  HEENT: MMM, conjunctiva and sclera clear  Gastrointestinal: Soft non-tender non-distended; No rebound or guarding  Skin: Warm and dry. No obvious rash    LABS:                        13.8   6.26  )-----------( 215      ( 01 Aug 2021 07:00 )             41.0     08-01    136  |  102  |  9   ----------------------------<  66<L>  4.3   |  18<L>  |  0.62    Ca    9.7      01 Aug 2021 07:00  Mg     2.2     08-01    TPro  7.0  /  Alb  4.2  /  TBili  0.4  /  DBili  x   /  AST  24  /  ALT  22  /  AlkPhos  145<H>  08-01                      RADIOLOGY & ADDITIONAL STUDIES:  Reviewed

## 2021-08-01 NOTE — PROGRESS NOTE ADULT - ASSESSMENT
66 year old female, former smoker, quit in January after developing Covid, with a PMHx of HTN, TIA 7 yrs ago, COVID 1/21, IBS, migraines, chronic neck and back pain, cholecystectomy, and hiatal hernia repair (2010), now with recurrent hernia.     #Nausea, vomiting, hiatal hernia  - s/p EGD: large hiatal hernia was visualized at the distal esophagus. There was pooling of gastric contents refluxing back into the esophagus, with associated esophagitis. There was approximately 100 cc of fluid in the gastric fundus, that was suctioned out. There was some mild gastritis also visualized in the fundus. The procedure was subsequently aborted as patient became apneic and hypoxic.   - Protonix 40 mg IV q 12 h   - patient's symptoms are most likely 2/2 recurrence of hiatal hernia; the inflammation in her esophagus and stomach are more likely sequela of the HH as opposed to the etiology of her n/v   - can obtain H pylori serum Ab; if positive, can treat w/ quadruple therapy    Thank you for allowing us to participate in the care of this patient.  GI will sign off. Please call back with any questions or concerns.     Joanie Claudio MD  PGY-5, Gastroenterology Fellow  pager: [phone]

## 2021-08-01 NOTE — PROGRESS NOTE ADULT - ASSESSMENT
66 year old female, former smoker, quit in January after developing Covid, with a PMHx of HTN, TIA 7yrs ago, COVID 1/21, IBS, migraines, chronic neck and back pain, cholecystectomy, and hiatal hernia repair (2010), now with a recurrent hernia. She presented to Dr. Baker's office to review esophagram and gastric emptying study. In office patient states she has been very nauseous with severe abdominal pain, vomiting multiple times during the day. Patient appeared unwell and pale and was referred to the ED For hydration, blood work, and CT Abdomen with IV/Oral contrast which confirmed hernia. Patient underwent EGD with GI 7/30 that revealed gastritits however was not completed 2/2 pt becoming apneic and hypoxic. Patient kept NPO, then transitioned to clear liquid diet. Patient tolerating sips of water, however refusing to try clears and advance her diet. Patient with 1 episode of emesis overnight. GI signed off with PPI recommendations and no further workup or evaluation. Patient went for CT head with c/f underlying process causing nausea/vomiting; CT head revealed ~11mm right frontal convexity calcified meningioma. Patient endorsing a panic attack today, however simultaneously refusing medications.    Neurovascular:   No delirium. Pain well controlled with current regimen.  -Tylenol PRN for pain    Cardiovascular:   Hemodynamically stable. HR controlled    Respiratory:   02 Sat = 98% on RA.  -Wean to RA from for O2 Sat > 93%.  -Encourage Cough, deep breathing and Use of IS 10x / hr while awake.  -Chest PT 4xdaily    GI:   PMHx Hiatial Hernia repair 2010 with recurrent herniation      - CT completed, final read as above  -protonix 40mg IV BID for GI protection  -IV Hydration  -clear liquid diet  -EGD 7/30/21: esophagitis    Renal / :   BUN/Cr Stable: 9/0.62  -Continue to monitor I/O's.    Endocrine:    Blood sugar stable      Hematologic:  H/H stable: 13.8/41  -DVT prophylaxis with Heparin sq    ID:  -Afebrile  -WBC 6.26  -Continue to observe for SIRS/Sepsis Syndrome.    Disposition:  Telemetry

## 2021-08-02 DIAGNOSIS — D32.9 BENIGN NEOPLASM OF MENINGES, UNSPECIFIED: ICD-10-CM

## 2021-08-02 LAB
ALBUMIN SERPL ELPH-MCNC: 3.8 G/DL — SIGNIFICANT CHANGE UP (ref 3.3–5)
ALP SERPL-CCNC: 116 U/L — SIGNIFICANT CHANGE UP (ref 40–120)
ALT FLD-CCNC: 14 U/L — SIGNIFICANT CHANGE UP (ref 10–45)
ANION GAP SERPL CALC-SCNC: 16 MMOL/L — SIGNIFICANT CHANGE UP (ref 5–17)
AST SERPL-CCNC: 14 U/L — SIGNIFICANT CHANGE UP (ref 10–40)
BILIRUB SERPL-MCNC: 0.4 MG/DL — SIGNIFICANT CHANGE UP (ref 0.2–1.2)
BUN SERPL-MCNC: 8 MG/DL — SIGNIFICANT CHANGE UP (ref 7–23)
CALCIUM SERPL-MCNC: 9.2 MG/DL — SIGNIFICANT CHANGE UP (ref 8.4–10.5)
CHLORIDE SERPL-SCNC: 108 MMOL/L — SIGNIFICANT CHANGE UP (ref 96–108)
CO2 SERPL-SCNC: 16 MMOL/L — LOW (ref 22–31)
CREAT SERPL-MCNC: 0.6 MG/DL — SIGNIFICANT CHANGE UP (ref 0.5–1.3)
GLUCOSE SERPL-MCNC: 66 MG/DL — LOW (ref 70–99)
HCT VFR BLD CALC: 33.5 % — LOW (ref 34.5–45)
HGB BLD-MCNC: 11.5 G/DL — SIGNIFICANT CHANGE UP (ref 11.5–15.5)
MAGNESIUM SERPL-MCNC: 1.8 MG/DL — SIGNIFICANT CHANGE UP (ref 1.6–2.6)
MCHC RBC-ENTMCNC: 33.4 PG — SIGNIFICANT CHANGE UP (ref 27–34)
MCHC RBC-ENTMCNC: 34.3 GM/DL — SIGNIFICANT CHANGE UP (ref 32–36)
MCV RBC AUTO: 97.4 FL — SIGNIFICANT CHANGE UP (ref 80–100)
NRBC # BLD: 0 /100 WBCS — SIGNIFICANT CHANGE UP (ref 0–0)
PLATELET # BLD AUTO: 211 K/UL — SIGNIFICANT CHANGE UP (ref 150–400)
POTASSIUM SERPL-MCNC: 3.7 MMOL/L — SIGNIFICANT CHANGE UP (ref 3.5–5.3)
POTASSIUM SERPL-SCNC: 3.7 MMOL/L — SIGNIFICANT CHANGE UP (ref 3.5–5.3)
PROT SERPL-MCNC: 6 G/DL — SIGNIFICANT CHANGE UP (ref 6–8.3)
RBC # BLD: 3.44 M/UL — LOW (ref 3.8–5.2)
RBC # FLD: 12.9 % — SIGNIFICANT CHANGE UP (ref 10.3–14.5)
SODIUM SERPL-SCNC: 140 MMOL/L — SIGNIFICANT CHANGE UP (ref 135–145)
WBC # BLD: 4.02 K/UL — SIGNIFICANT CHANGE UP (ref 3.8–10.5)
WBC # FLD AUTO: 4.02 K/UL — SIGNIFICANT CHANGE UP (ref 3.8–10.5)

## 2021-08-02 PROCEDURE — 99222 1ST HOSP IP/OBS MODERATE 55: CPT

## 2021-08-02 PROCEDURE — 99231 SBSQ HOSP IP/OBS SF/LOW 25: CPT

## 2021-08-02 PROCEDURE — 71045 X-RAY EXAM CHEST 1 VIEW: CPT | Mod: 26

## 2021-08-02 PROCEDURE — 99232 SBSQ HOSP IP/OBS MODERATE 35: CPT

## 2021-08-02 RX ORDER — FAMOTIDINE 10 MG/ML
20 INJECTION INTRAVENOUS
Refills: 0 | Status: DISCONTINUED | OUTPATIENT
Start: 2021-08-02 | End: 2021-08-04

## 2021-08-02 RX ORDER — ALPRAZOLAM 0.25 MG
0.25 TABLET ORAL ONCE
Refills: 0 | Status: DISCONTINUED | OUTPATIENT
Start: 2021-08-02 | End: 2021-08-02

## 2021-08-02 RX ORDER — SODIUM CHLORIDE 9 MG/ML
1000 INJECTION INTRAMUSCULAR; INTRAVENOUS; SUBCUTANEOUS
Refills: 0 | Status: DISCONTINUED | OUTPATIENT
Start: 2021-08-02 | End: 2021-08-04

## 2021-08-02 RX ORDER — ACETAMINOPHEN 500 MG
650 TABLET ORAL EVERY 6 HOURS
Refills: 0 | Status: DISCONTINUED | OUTPATIENT
Start: 2021-08-02 | End: 2021-08-04

## 2021-08-02 RX ORDER — DEXAMETHASONE 0.5 MG/5ML
4 ELIXIR ORAL EVERY 8 HOURS
Refills: 0 | Status: DISCONTINUED | OUTPATIENT
Start: 2021-08-02 | End: 2021-08-04

## 2021-08-02 RX ORDER — MAGNESIUM OXIDE 400 MG ORAL TABLET 241.3 MG
800 TABLET ORAL ONCE
Refills: 0 | Status: COMPLETED | OUTPATIENT
Start: 2021-08-02 | End: 2021-08-02

## 2021-08-02 RX ORDER — POLYETHYLENE GLYCOL 3350 17 G/17G
17 POWDER, FOR SOLUTION ORAL DAILY
Refills: 0 | Status: DISCONTINUED | OUTPATIENT
Start: 2021-08-02 | End: 2021-08-04

## 2021-08-02 RX ORDER — POTASSIUM CHLORIDE 20 MEQ
40 PACKET (EA) ORAL ONCE
Refills: 0 | Status: COMPLETED | OUTPATIENT
Start: 2021-08-02 | End: 2021-08-02

## 2021-08-02 RX ORDER — SENNA PLUS 8.6 MG/1
2 TABLET ORAL AT BEDTIME
Refills: 0 | Status: DISCONTINUED | OUTPATIENT
Start: 2021-08-02 | End: 2021-08-04

## 2021-08-02 RX ADMIN — Medication 5 MILLIGRAM(S): at 17:15

## 2021-08-02 RX ADMIN — ONDANSETRON 4 MILLIGRAM(S): 8 TABLET, FILM COATED ORAL at 05:45

## 2021-08-02 RX ADMIN — SODIUM CHLORIDE 3 MILLILITER(S): 9 INJECTION INTRAMUSCULAR; INTRAVENOUS; SUBCUTANEOUS at 11:37

## 2021-08-02 RX ADMIN — PANTOPRAZOLE SODIUM 40 MILLIGRAM(S): 20 TABLET, DELAYED RELEASE ORAL at 17:14

## 2021-08-02 RX ADMIN — SODIUM CHLORIDE 3 MILLILITER(S): 9 INJECTION INTRAMUSCULAR; INTRAVENOUS; SUBCUTANEOUS at 05:45

## 2021-08-02 RX ADMIN — Medication 4 MILLIGRAM(S): at 17:42

## 2021-08-02 RX ADMIN — MAGNESIUM OXIDE 400 MG ORAL TABLET 800 MILLIGRAM(S): 241.3 TABLET ORAL at 07:39

## 2021-08-02 RX ADMIN — Medication 5 MILLIGRAM(S): at 11:36

## 2021-08-02 RX ADMIN — HEPARIN SODIUM 5000 UNIT(S): 5000 INJECTION INTRAVENOUS; SUBCUTANEOUS at 21:40

## 2021-08-02 RX ADMIN — Medication 30 MILLILITER(S): at 21:40

## 2021-08-02 RX ADMIN — SENNA PLUS 2 TABLET(S): 8.6 TABLET ORAL at 21:40

## 2021-08-02 RX ADMIN — HYDROMORPHONE HYDROCHLORIDE 0.5 MILLIGRAM(S): 2 INJECTION INTRAMUSCULAR; INTRAVENOUS; SUBCUTANEOUS at 09:17

## 2021-08-02 RX ADMIN — FAMOTIDINE 20 MILLIGRAM(S): 10 INJECTION INTRAVENOUS at 17:15

## 2021-08-02 RX ADMIN — SODIUM CHLORIDE 75 MILLILITER(S): 9 INJECTION INTRAMUSCULAR; INTRAVENOUS; SUBCUTANEOUS at 17:46

## 2021-08-02 RX ADMIN — PANTOPRAZOLE SODIUM 40 MILLIGRAM(S): 20 TABLET, DELAYED RELEASE ORAL at 05:45

## 2021-08-02 RX ADMIN — Medication 30 MILLILITER(S): at 11:38

## 2021-08-02 RX ADMIN — POLYETHYLENE GLYCOL 3350 17 GRAM(S): 17 POWDER, FOR SOLUTION ORAL at 11:37

## 2021-08-02 RX ADMIN — SODIUM CHLORIDE 3 MILLILITER(S): 9 INJECTION INTRAMUSCULAR; INTRAVENOUS; SUBCUTANEOUS at 21:41

## 2021-08-02 RX ADMIN — ONDANSETRON 4 MILLIGRAM(S): 8 TABLET, FILM COATED ORAL at 00:39

## 2021-08-02 RX ADMIN — HEPARIN SODIUM 5000 UNIT(S): 5000 INJECTION INTRAVENOUS; SUBCUTANEOUS at 05:45

## 2021-08-02 RX ADMIN — Medication 30 MILLILITER(S): at 17:15

## 2021-08-02 RX ADMIN — ONDANSETRON 4 MILLIGRAM(S): 8 TABLET, FILM COATED ORAL at 11:37

## 2021-08-02 RX ADMIN — ONDANSETRON 4 MILLIGRAM(S): 8 TABLET, FILM COATED ORAL at 17:14

## 2021-08-02 RX ADMIN — Medication 0.25 MILLIGRAM(S): at 11:55

## 2021-08-02 RX ADMIN — CITALOPRAM 20 MILLIGRAM(S): 10 TABLET, FILM COATED ORAL at 11:36

## 2021-08-02 RX ADMIN — Medication 4 MILLIGRAM(S): at 22:23

## 2021-08-02 RX ADMIN — ZOLPIDEM TARTRATE 5 MILLIGRAM(S): 10 TABLET ORAL at 21:41

## 2021-08-02 RX ADMIN — Medication 1000 MILLIGRAM(S): at 09:17

## 2021-08-02 RX ADMIN — HEPARIN SODIUM 5000 UNIT(S): 5000 INJECTION INTRAVENOUS; SUBCUTANEOUS at 13:39

## 2021-08-02 RX ADMIN — Medication 40 MILLIEQUIVALENT(S): at 07:39

## 2021-08-02 NOTE — PROGRESS NOTE ADULT - SUBJECTIVE AND OBJECTIVE BOX
Patient discussed on morning rounds with Dr. Baker    Operation / Date: Recurrent Hiatal hernia    SUBJECTIVE ASSESSMENT:  66y Female seen and examined at bedside.  Patient denies chest pain, shortness of breath.  Reports feeling nauseous this morning but reports no vomiting.        Vital Signs Last 24 Hrs  T(C): 36.7 (02 Aug 2021 09:31), Max: 36.8 (01 Aug 2021 14:12)  T(F): 98.1 (02 Aug 2021 09:31), Max: 98.3 (01 Aug 2021 14:12)  HR: 92 (02 Aug 2021 09:31) (78 - 94)  BP: 134/89 (02 Aug 2021 09:31) (109/58 - 138/86)  BP(mean): 106 (02 Aug 2021 09:31) (76 - 106)  RR: 16 (02 Aug 2021 09:31) (16 - 20)  SpO2: 98% (02 Aug 2021 09:31) (96% - 98%)  I&O's Detail    01 Aug 2021 07:01  -  02 Aug 2021 07:00  --------------------------------------------------------  IN:    IV PiggyBack: 200 mL    Oral Fluid: 120 mL    sodium chloride 0.9%: 1650 mL  Total IN: 1970 mL    OUT:    Voided (mL): 1550 mL  Total OUT: 1550 mL    Total NET: 420 mL      02 Aug 2021 07:01  -  02 Aug 2021 12:17  --------------------------------------------------------  IN:  Total IN: 0 mL    OUT:    Voided (mL): 600 mL  Total OUT: 600 mL    Total NET: -600 mL      PHYSICAL EXAM:    GEN: NAD, looks comfortable  Psych: Mood appropriate  Neuro: A&Ox3.  No focal deficits.  Moving all extremities.   HEENT: No obvious abnormalities  CV: S1S2, regular, no murmurs appreciated.  No carotid bruits.  No JVD  Lungs: Clear B/L.  No wheezing, rales or rhonchi  ABD: Soft, non-tender, non-distended.  +Bowel sounds  EXT: Warm and well perfused.  No peripheral edema noted  Musculoskeletal: Moving all extremities with normal ROM, no joint swelling  PV: Pedal pulses palpable    LABS:                        11.5   4.02  )-----------( 211      ( 02 Aug 2021 06:26 )             33.5       COUMADIN:  No. REASON: .        08-02    140  |  108  |  8   ----------------------------<  66<L>  3.7   |  16<L>  |  0.60    Ca    9.2      02 Aug 2021 06:26  Mg     1.8     08-02    TPro  6.0  /  Alb  3.8  /  TBili  0.4  /  DBili  x   /  AST  14  /  ALT  14  /  AlkPhos  116  08-02          MEDICATIONS  (STANDING):  amLODIPine   Tablet 2.5 milliGRAM(s) Oral daily  bisacodyl 5 milliGRAM(s) Oral every 12 hours  citalopram 20 milliGRAM(s) Oral daily  heparin   Injectable 5000 Unit(s) SubCutaneous every 8 hours  losartan 50 milliGRAM(s) Oral daily  ondansetron Injectable 4 milliGRAM(s) IV Push every 6 hours  pantoprazole  Injectable 40 milliGRAM(s) IV Push every 12 hours  polyethylene glycol 3350 17 Gram(s) Oral daily  senna 2 Tablet(s) Oral at bedtime  sodium chloride 0.9% lock flush 3 milliLiter(s) IV Push every 8 hours  sodium chloride 0.9%. 1000 milliLiter(s) (75 mL/Hr) IV Continuous <Continuous>    MEDICATIONS  (PRN):  acetaminophen   Tablet .. 650 milliGRAM(s) Oral every 6 hours PRN Severe Pain (7 - 10)  aluminum hydroxide/magnesium hydroxide/simethicone Suspension 30 milliLiter(s) Oral every 4 hours PRN Dyspepsia  clonazePAM  Tablet 1 milliGRAM(s) Oral at bedtime PRN agitation/ insomnia  cyclobenzaprine 10 milliGRAM(s) Oral three times a day PRN Muscle Spasm  zolpidem 5 milliGRAM(s) Oral at bedtime PRN Insomnia        RADIOLOGY & ADDITIONAL TESTS:

## 2021-08-02 NOTE — PROGRESS NOTE ADULT - ASSESSMENT
66 year old female, former smoker, quit in January after developing Covid, with a PMHx of HTN, TIA 7yrs ago, COVID 1/21, IBS, migraines, chronic neck and back pain, cholecystectomy, and hiatal hernia repair (2010), now with a recurrent hernia. She presented to Dr. Baker's office to review esophagram and gastric emptying study. In office patient states she has been very nauseous with severe abdominal pain, vomiting multiple times during the day. Patient appeared unwell and pale and was referred to the ED For hydration, blood work, and CT Abdomen with IV/Oral contrast which confirmed hernia. Patient underwent EGD with GI 7/30 that revealed gastritits however was not completed 2/2 pt becoming apneic and hypoxic. Patient kept NPO, then transitioned to clear liquid diet. Patient tolerating sips of water, however refusing to try clears and advance her diet. Patient with 1 episode of emesis overnight. GI signed off with PPI recommendations and no further workup or evaluation. Patient went for CT head with c/f underlying process causing nausea/vomiting; CT head revealed ~11mm right frontal convexity calcified meningioma. Patient endorsing a panic attack today, however simultaneously refusing medications.    Neurovascular:   No delirium. Pain well controlled with current regimen.  -Tylenol PRN for pain  -Neurology and neurosurgery consulted for meningioma    Cardiovascular:   Hemodynamically stable. HR controlled    Respiratory:   02 Sat = 98% on RA.  -Wean to RA from for O2 Sat > 93%.  -Encourage Cough, deep breathing and Use of IS 10x / hr while awake.  -Chest PT 4xdaily    GI:   PMHx Hiatial Hernia repair 2010 with recurrent herniation      - CT completed, final read as above  -protonix 40mg IV BID for GI protection  -IV Hydration  -clear liquid diet  -EGD 7/30/21: esophagitis    Renal / :   BUN/Cr Stable: 8/0.6  -Continue to monitor I/O's.    Endocrine:    Blood sugar stable      Hematologic:  H/H stable: 11.5/33.5  -DVT prophylaxis with Heparin sq    ID:  -Afebrile  -WBC 4.02  -Continue to observe for SIRS/Sepsis Syndrome.    Disposition:  Telemetry

## 2021-08-02 NOTE — CONSULT NOTE ADULT - ASSESSMENT
66 Female w/ Migraines, IBS, HTN, COVID 01/21, hiatal hernia repair with recurrent hernia admitted with vomiting, nausea and abdominal pain with incidental finding of a small right frontal calcified meningioma, known to patient. As per the patient she has been following her PCP and has this lesion for 'many years'. Patient stated that she had CT brain 2 years ago and was told by her PCP that her frontal meningioma did not increase in size. As per Imaging review and history obtained from the patient Nausea and vomiting is less likely from the lesion.

## 2021-08-02 NOTE — DIETITIAN INITIAL EVALUATION ADULT. - OTHER CALCULATIONS
Ideal body weight used for calculations as pt >120% of IBW. (163% IBW). Nutrient needs based on [facility] standards of care for maintenance in adults, adjusted for age, onc needs, hypermetabolic state, fluid per team

## 2021-08-02 NOTE — CONSULT NOTE ADULT - ATTENDING COMMENTS
65 y/o F with HH repair 10 years ago, admitted for Nausea/vomiting, CT shows recurrence of hernia, GI consulted for EGD to evaluate  S/p EGD - HH with pooling of gastric contents, procedure aborted as patient became hypoxic  Seen bedside later, still nauseous,   CXR did not show any new infiltrate     A/p   Recurrent HH - management as per surgery team   Follow vitals and lab for any signs of new infection   Further recommendations pending clinical course
Small asymptomatic meningioma, already known to patient and followed by her neurologist, unrelated to her nausea/vomiting.  No additional neurologic work up or treatment needed at this time.
Patient seen and examined by me 8/2/2021. CT head showing incidental small calcified right frontal lesion thought to be meningioma by radiology, no mass effect. Patient has known about this lesion for some time. No intervention needed for this. Elective outpatient MRI brain reasonable for followup.    Aidan Cordova M.D.

## 2021-08-02 NOTE — CONSULT NOTE ADULT - PROBLEM SELECTOR RECOMMENDATION 9
CT Head with incidental finding of small calcified lesion,  As of now patient states that no episode of nausea or vomiting for the last 24h  VSS  Recommendations:  - would r/o other causes of symptoms  - Neurosurgery evaluation  - Rest of the plan as per the primary team  - Outpatient follow up

## 2021-08-02 NOTE — CONSULT NOTE ADULT - ASSESSMENT
66 Female w/ Migraines, IBS, HTN, COVID 01/21, hiatal hernia repair with recurrent hernia admitted with vomiting, nausea and abdominal pain with incidental finding of a small right frontal calcified meningioma. 66 Female w/ Migraines, IBS, HTN, COVID 01/21, hiatal hernia repair with recurrent hernia admitted with vomiting, nausea and abdominal pain with incidental finding of a small right frontal calcified meningioma, known to patient.

## 2021-08-02 NOTE — CONSULT NOTE ADULT - PROBLEM SELECTOR RECOMMENDATION 9
Case and films reviewed with Dr. Cordova,  CT Head with incidental finding of small calcified lesion,  No neurosurgical intervention required and symptoms are unlikely to be associated with this imaging findings, Case and films reviewed with Dr. Cordova,  CT Head with incidental finding of small calcified lesion,  No neurosurgical intervention required and symptoms are not associated with these imaging findings,  Recommend MRI Brain with and without Gadolinium, non-urgent, and can follow-up Dr. Cordova outpatient.

## 2021-08-02 NOTE — DIETITIAN INITIAL EVALUATION ADULT. - PERSON TAUGHT/METHOD
encouraged intake and discussed likely stages of diet advancement with pt/verbal instruction/patient instructed

## 2021-08-02 NOTE — DIETITIAN INITIAL EVALUATION ADULT. - ADD RECOMMEND
1. Encourage intake through day 2. Manage pain prn 3. Trend wts 4. Reinforce ed 5. Monitor and replete lytes 6. Advance diet as appropriate

## 2021-08-02 NOTE — CONSULT NOTE ADULT - SUBJECTIVE AND OBJECTIVE BOX
HISTORY OF PRESENT ILLNESS: 66 year old female, former smoker, quit in January after developing Covid, with a PMHx of HTN, TIA 7 yrs ago, COVID 1/21, IBS, migraines, chronic neck and back pain, cholecystectomy, and hiatal hernia repair (2010), now with recurrent hernia. Patient states she has been very nauseous with severe abdominal pain, vomiting multiple times during the day. CT Head performed shows a small 11mm calcified meningioma in the right frontal region. She denies any visual changes, extremity weakness/numbness, gait disturbance or seizures.    PAST MEDICAL & SURGICAL HISTORY:  HTN (hypertension)    HLD (hyperlipidemia)    History of COVID-19  1/2021    History of TIAs  2014    S/P cholecystectomy    History of repair of hiatal hernia  2010      FAMILY HISTORY:      SOCIAL HISTORY:  Tobacco Use:  EtOH use:   Substance:    Allergies    penicillin (Unknown)    Intolerances        REVIEW OF SYSTEMS  see HPI, otherwise non-contributory.    MEDICATIONS:  Antibiotics:    Neuro:  citalopram 20 milliGRAM(s) Oral daily  clonazePAM  Tablet 1 milliGRAM(s) Oral at bedtime PRN  cyclobenzaprine 10 milliGRAM(s) Oral three times a day PRN  ondansetron Injectable 4 milliGRAM(s) IV Push every 6 hours  zolpidem 5 milliGRAM(s) Oral at bedtime PRN    Anticoagulation:  heparin   Injectable 5000 Unit(s) SubCutaneous every 8 hours    OTHER:  aluminum hydroxide/magnesium hydroxide/simethicone Suspension 30 milliLiter(s) Oral every 4 hours PRN  amLODIPine   Tablet 2.5 milliGRAM(s) Oral daily  losartan 50 milliGRAM(s) Oral daily  pantoprazole  Injectable 40 milliGRAM(s) IV Push every 12 hours    IVF:  sodium chloride 0.9% lock flush 3 milliLiter(s) IV Push every 8 hours  sodium chloride 0.9%. 1000 milliLiter(s) IV Continuous <Continuous>      Vital Signs Last 24 Hrs  T(C): 36.1 (02 Aug 2021 04:40), Max: 36.8 (01 Aug 2021 14:12)  T(F): 97 (02 Aug 2021 04:40), Max: 98.3 (01 Aug 2021 14:12)  HR: 84 (02 Aug 2021 04:50) (78 - 94)  BP: 124/76 (02 Aug 2021 04:50) (109/58 - 141/90)  BP(mean): 96 (02 Aug 2021 04:50) (76 - 107)  RR: 18 (02 Aug 2021 04:50) (18 - 20)  SpO2: 97% (02 Aug 2021 04:50) (95% - 97%)    PHYSICAL EXAM:  Gen: NAD, AAOx3  HEENT: PERRL. EOMI. VF grossly intact.  Neck: FROM, nontender  Neuro: CNs II-XII intact. 5/5 str x4 extremities. Sensation to LT intact throughout. Following commands. Speech clear. Normoreflexic throughout.    LABS:                        11.5   4.02  )-----------( 211      ( 02 Aug 2021 06:26 )             33.5     08-02    140  |  108  |  8   ----------------------------<  66<L>  3.7   |  16<L>  |  0.60    Ca    9.2      02 Aug 2021 06:26  Mg     1.8     08-02    TPro  6.0  /  Alb  3.8  /  TBili  0.4  /  DBili  x   /  AST  14  /  ALT  14  /  AlkPhos  116  08-02        CULTURES:      RADIOLOGY & ADDITIONAL STUDIES: < from: CT Head No Cont (08.01.21 @ 10:42) >    IMPRESSION: Approximately 11 mm right frontal convexity calcified meningioma.     HISTORY OF PRESENT ILLNESS: 66 year old female, former smoker, quit in January after developing Covid, with a PMHx of HTN, TIA 7 yrs ago, COVID 1/21, IBS, migraines, chronic neck and back pain, cholecystectomy, and hiatal hernia repair (2010), now with recurrent hernia. Patient states she has been very nauseous with severe abdominal pain, vomiting multiple times during the day. CT Head performed shows a small 11mm calcified meningioma in the right frontal region which patient has known about for years. She denies any visual changes, extremity weakness/numbness, gait disturbance or seizures.    PAST MEDICAL & SURGICAL HISTORY:  HTN (hypertension)    HLD (hyperlipidemia)    History of COVID-19  1/2021    History of TIAs  2014    S/P cholecystectomy    History of repair of hiatal hernia  2010      FAMILY HISTORY:      SOCIAL HISTORY:  Tobacco Use: Denies  EtOH use:  Denies  Substance: Denies    Allergies    penicillin (Unknown)    Intolerances        REVIEW OF SYSTEMS  see HPI, otherwise non-contributory.    MEDICATIONS:  Antibiotics:    Neuro:  citalopram 20 milliGRAM(s) Oral daily  clonazePAM  Tablet 1 milliGRAM(s) Oral at bedtime PRN  cyclobenzaprine 10 milliGRAM(s) Oral three times a day PRN  ondansetron Injectable 4 milliGRAM(s) IV Push every 6 hours  zolpidem 5 milliGRAM(s) Oral at bedtime PRN    Anticoagulation:  heparin   Injectable 5000 Unit(s) SubCutaneous every 8 hours    OTHER:  aluminum hydroxide/magnesium hydroxide/simethicone Suspension 30 milliLiter(s) Oral every 4 hours PRN  amLODIPine   Tablet 2.5 milliGRAM(s) Oral daily  losartan 50 milliGRAM(s) Oral daily  pantoprazole  Injectable 40 milliGRAM(s) IV Push every 12 hours    IVF:  sodium chloride 0.9% lock flush 3 milliLiter(s) IV Push every 8 hours  sodium chloride 0.9%. 1000 milliLiter(s) IV Continuous <Continuous>      Vital Signs Last 24 Hrs  T(C): 36.1 (02 Aug 2021 04:40), Max: 36.8 (01 Aug 2021 14:12)  T(F): 97 (02 Aug 2021 04:40), Max: 98.3 (01 Aug 2021 14:12)  HR: 84 (02 Aug 2021 04:50) (78 - 94)  BP: 124/76 (02 Aug 2021 04:50) (109/58 - 141/90)  BP(mean): 96 (02 Aug 2021 04:50) (76 - 107)  RR: 18 (02 Aug 2021 04:50) (18 - 20)  SpO2: 97% (02 Aug 2021 04:50) (95% - 97%)    PHYSICAL EXAM:  Gen: NAD, AAOx3  HEENT: PERRL. EOMI. VF grossly intact.  Neck: FROM, nontender  Neuro: CNs II-XII intact. 5/5 str x4 extremities. Sensation to LT intact throughout. Following commands. Speech clear. Normoreflexic throughout.    LABS:                        11.5   4.02  )-----------( 211      ( 02 Aug 2021 06:26 )             33.5     08-02    140  |  108  |  8   ----------------------------<  66<L>  3.7   |  16<L>  |  0.60    Ca    9.2      02 Aug 2021 06:26  Mg     1.8     08-02    TPro  6.0  /  Alb  3.8  /  TBili  0.4  /  DBili  x   /  AST  14  /  ALT  14  /  AlkPhos  116  08-02        CULTURES:      RADIOLOGY & ADDITIONAL STUDIES: < from: CT Head No Cont (08.01.21 @ 10:42) >    IMPRESSION: Approximately 11 mm right frontal convexity calcified meningioma.

## 2021-08-02 NOTE — DIETITIAN INITIAL EVALUATION ADULT. - OTHER INFO
66F former smoker, quit in January after developing Covid, with a PMHx of HTN, TIA 7yrs ago, COVID 1/21, IBS, migraines, chronic neck and back pain, cholecystectomy, and hiatal hernia repair (2010), now with a recurrent hernia. She presented to Dr. Baker's office to review esophagram and gastric emptying study. In office patient states she has been very nauseous with severe abdominal pain, vomiting multiple times during the day. Patient appeared unwell and pale and was referred to the ED For hydration, blood work, and CT Abdomen with IV/Oral contrast which confirmed hernia. Patient underwent EGD with GI 7/30 that revealed gastritits however was not completed 2/2 pt becoming apneic and hypoxic. Patient kept NPO, then transitioned to clear liquid diet. Patient tolerating sips of water, however refusing to try clears and advance her diet. Patient with 1 episode of emesis overnight. GI signed off with PPI recommendations and no further workup or evaluation. Patient went for CT head with c/f underlying process causing nausea/vomiting; CT head revealed ~11mm right frontal convexity calcified meningioma, which she has had for many years. Patient stated that she had CT brain 2 years ago and was told by her PCP that her frontal meningioma did not increase in size. As per Imaging review and history obtained from the patient N/V is less likely from the lesion per nuero. Patient endorsing a panic attack today and simultaneously refusing medications.  Pt observed sitting in chair noting she wants normal food, does not want "clear diet" also lacks appetite d/t pain and episodes of n/v. Discussed purpose of CLD with pt at time of assessment and importance of trialing clears/ drinking adequate fluids. Notes abdominal discomfort, no d/c, chewing/ swallowing issues impacting intake, skin intact. NKFA or wt changes noted. Discussed likely stages of diet advancement with pt. Will continue to follow per protocol and reinforce ed as needed.

## 2021-08-03 LAB
ANION GAP SERPL CALC-SCNC: 15 MMOL/L — SIGNIFICANT CHANGE UP (ref 5–17)
BUN SERPL-MCNC: 7 MG/DL — SIGNIFICANT CHANGE UP (ref 7–23)
CALCIUM SERPL-MCNC: 9.5 MG/DL — SIGNIFICANT CHANGE UP (ref 8.4–10.5)
CHLORIDE SERPL-SCNC: 103 MMOL/L — SIGNIFICANT CHANGE UP (ref 96–108)
CO2 SERPL-SCNC: 19 MMOL/L — LOW (ref 22–31)
CREAT SERPL-MCNC: 0.58 MG/DL — SIGNIFICANT CHANGE UP (ref 0.5–1.3)
GLUCOSE SERPL-MCNC: 126 MG/DL — HIGH (ref 70–99)
H PYLORI AB SER-ACNC: 7.8 UNITS — SIGNIFICANT CHANGE UP
HCT VFR BLD CALC: 35.3 % — SIGNIFICANT CHANGE UP (ref 34.5–45)
HGB BLD-MCNC: 12.4 G/DL — SIGNIFICANT CHANGE UP (ref 11.5–15.5)
MAGNESIUM SERPL-MCNC: 1.9 MG/DL — SIGNIFICANT CHANGE UP (ref 1.6–2.6)
MCHC RBC-ENTMCNC: 33.2 PG — SIGNIFICANT CHANGE UP (ref 27–34)
MCHC RBC-ENTMCNC: 35.1 GM/DL — SIGNIFICANT CHANGE UP (ref 32–36)
MCV RBC AUTO: 94.4 FL — SIGNIFICANT CHANGE UP (ref 80–100)
NRBC # BLD: 0 /100 WBCS — SIGNIFICANT CHANGE UP (ref 0–0)
PLATELET # BLD AUTO: 239 K/UL — SIGNIFICANT CHANGE UP (ref 150–400)
POTASSIUM SERPL-MCNC: 4.4 MMOL/L — SIGNIFICANT CHANGE UP (ref 3.5–5.3)
POTASSIUM SERPL-SCNC: 4.4 MMOL/L — SIGNIFICANT CHANGE UP (ref 3.5–5.3)
RBC # BLD: 3.74 M/UL — LOW (ref 3.8–5.2)
RBC # FLD: 12.8 % — SIGNIFICANT CHANGE UP (ref 10.3–14.5)
SODIUM SERPL-SCNC: 137 MMOL/L — SIGNIFICANT CHANGE UP (ref 135–145)
WBC # BLD: 4.43 K/UL — SIGNIFICANT CHANGE UP (ref 3.8–10.5)
WBC # FLD AUTO: 4.43 K/UL — SIGNIFICANT CHANGE UP (ref 3.8–10.5)

## 2021-08-03 PROCEDURE — 99221 1ST HOSP IP/OBS SF/LOW 40: CPT

## 2021-08-03 PROCEDURE — 71045 X-RAY EXAM CHEST 1 VIEW: CPT | Mod: 26

## 2021-08-03 RX ORDER — SUMATRIPTAN SUCCINATE 4 MG/.5ML
100 INJECTION, SOLUTION SUBCUTANEOUS DAILY
Refills: 0 | Status: DISCONTINUED | OUTPATIENT
Start: 2021-08-03 | End: 2021-08-03

## 2021-08-03 RX ORDER — SUMATRIPTAN SUCCINATE 4 MG/.5ML
100 INJECTION, SOLUTION SUBCUTANEOUS DAILY
Refills: 0 | Status: DISCONTINUED | OUTPATIENT
Start: 2021-08-03 | End: 2021-08-04

## 2021-08-03 RX ADMIN — ONDANSETRON 4 MILLIGRAM(S): 8 TABLET, FILM COATED ORAL at 18:25

## 2021-08-03 RX ADMIN — SODIUM CHLORIDE 3 MILLILITER(S): 9 INJECTION INTRAMUSCULAR; INTRAVENOUS; SUBCUTANEOUS at 14:08

## 2021-08-03 RX ADMIN — PANTOPRAZOLE SODIUM 40 MILLIGRAM(S): 20 TABLET, DELAYED RELEASE ORAL at 18:25

## 2021-08-03 RX ADMIN — Medication 1 MILLIGRAM(S): at 22:13

## 2021-08-03 RX ADMIN — SODIUM CHLORIDE 3 MILLILITER(S): 9 INJECTION INTRAMUSCULAR; INTRAVENOUS; SUBCUTANEOUS at 06:45

## 2021-08-03 RX ADMIN — Medication 650 MILLIGRAM(S): at 12:37

## 2021-08-03 RX ADMIN — Medication 30 MILLILITER(S): at 21:49

## 2021-08-03 RX ADMIN — FAMOTIDINE 20 MILLIGRAM(S): 10 INJECTION INTRAVENOUS at 18:25

## 2021-08-03 RX ADMIN — ONDANSETRON 4 MILLIGRAM(S): 8 TABLET, FILM COATED ORAL at 12:34

## 2021-08-03 RX ADMIN — AMLODIPINE BESYLATE 2.5 MILLIGRAM(S): 2.5 TABLET ORAL at 06:11

## 2021-08-03 RX ADMIN — PANTOPRAZOLE SODIUM 40 MILLIGRAM(S): 20 TABLET, DELAYED RELEASE ORAL at 06:12

## 2021-08-03 RX ADMIN — SUMATRIPTAN SUCCINATE 100 MILLIGRAM(S): 4 INJECTION, SOLUTION SUBCUTANEOUS at 09:46

## 2021-08-03 RX ADMIN — SODIUM CHLORIDE 3 MILLILITER(S): 9 INJECTION INTRAMUSCULAR; INTRAVENOUS; SUBCUTANEOUS at 22:01

## 2021-08-03 RX ADMIN — HEPARIN SODIUM 5000 UNIT(S): 5000 INJECTION INTRAVENOUS; SUBCUTANEOUS at 06:11

## 2021-08-03 RX ADMIN — POLYETHYLENE GLYCOL 3350 17 GRAM(S): 17 POWDER, FOR SOLUTION ORAL at 12:35

## 2021-08-03 RX ADMIN — ONDANSETRON 4 MILLIGRAM(S): 8 TABLET, FILM COATED ORAL at 06:12

## 2021-08-03 RX ADMIN — ONDANSETRON 4 MILLIGRAM(S): 8 TABLET, FILM COATED ORAL at 00:21

## 2021-08-03 RX ADMIN — LOSARTAN POTASSIUM 50 MILLIGRAM(S): 100 TABLET, FILM COATED ORAL at 06:12

## 2021-08-03 RX ADMIN — Medication 4 MILLIGRAM(S): at 21:48

## 2021-08-03 RX ADMIN — Medication 4 MILLIGRAM(S): at 14:10

## 2021-08-03 RX ADMIN — FAMOTIDINE 20 MILLIGRAM(S): 10 INJECTION INTRAVENOUS at 06:11

## 2021-08-03 RX ADMIN — Medication 5 MILLIGRAM(S): at 06:11

## 2021-08-03 RX ADMIN — ZOLPIDEM TARTRATE 5 MILLIGRAM(S): 10 TABLET ORAL at 23:37

## 2021-08-03 RX ADMIN — SENNA PLUS 2 TABLET(S): 8.6 TABLET ORAL at 21:49

## 2021-08-03 RX ADMIN — Medication 4 MILLIGRAM(S): at 06:11

## 2021-08-03 RX ADMIN — ONDANSETRON 4 MILLIGRAM(S): 8 TABLET, FILM COATED ORAL at 23:37

## 2021-08-03 RX ADMIN — HEPARIN SODIUM 5000 UNIT(S): 5000 INJECTION INTRAVENOUS; SUBCUTANEOUS at 14:10

## 2021-08-03 RX ADMIN — SUMATRIPTAN SUCCINATE 100 MILLIGRAM(S): 4 INJECTION, SOLUTION SUBCUTANEOUS at 09:42

## 2021-08-03 RX ADMIN — HEPARIN SODIUM 5000 UNIT(S): 5000 INJECTION INTRAVENOUS; SUBCUTANEOUS at 21:48

## 2021-08-03 RX ADMIN — CITALOPRAM 20 MILLIGRAM(S): 10 TABLET, FILM COATED ORAL at 12:34

## 2021-08-03 NOTE — PROGRESS NOTE ADULT - SUBJECTIVE AND OBJECTIVE BOX
`Patient discussed on morning rounds with Dr. Baker.      Operation / Date: Recurrent Hiatal hernia    SUBJECTIVE ASSESSMENT:  66y Female seen and examined at bedside. Reports feeling much better today and has not been nauseous or vomited since yesterday afternoon. She describes a constant pressure-like 4/10 diffuse abdominal pain for which the Tylenol does not help, but was able to eat some jello this morning. Patient is also endorsing anxiety about possible surgery tomorrow. Denies chest pain, shortness of breath, calf tenderness, lower extremity edema.      Vital Signs Last 24 Hrs  T(C): 36.2 (03 Aug 2021 09:14), Max: 36.4 (02 Aug 2021 21:27)  T(F): 97.2 (03 Aug 2021 09:14), Max: 97.6 (02 Aug 2021 21:27)  HR: 86 (03 Aug 2021 09:55) (76 - 98)  BP: 158/60 (03 Aug 2021 09:55) (122/73 - 158/60)  BP(mean): 86 (03 Aug 2021 09:55) (86 - 100)  RR: 20 (03 Aug 2021 09:55) (16 - 20)  SpO2: 96% (03 Aug 2021 09:55) (96% - 98%)    I&O's Detail    02 Aug 2021 07:01  -  03 Aug 2021 07:00  --------------------------------------------------------  IN:    Oral Fluid: 620 mL    sodium chloride 0.9%: 1725 mL  Total IN: 2345 mL    OUT:    Voided (mL): 1250 mL  Total OUT: 1250 mL    Total NET: 1095 mL          CHEST TUBE:  No.    ANTHONY DRAIN:  No.  EPICARDIAL WIRES: No.  TIE DOWNS: No.  POSADAS: No.    PHYSICAL EXAM:    GEN: NAD, looks comfortable  Psych: Anxious mood.  Neuro: A&Ox3.  No focal deficits.  Moving all extremities.   HEENT: No obvious abnormalities  CV: S1S2, regular, no murmurs appreciated.  No carotid bruits.  No JVD  Lungs: Clear B/L.  No wheezing, rales or rhonchi.  ABD: Soft, non-tender, non-distended.    EXT: Warm and well perfused.  No peripheral edema noted  Musculoskeletal: Moving all extremities with normal ROM, no joint swelling  PV: Pedal pulses palpable    LABS:                        12.4   4.43  )-----------( 239      ( 03 Aug 2021 06:14 )             35.3       COUMADIN:  No. REASON: no need for AC.        08-03    137  |  103  |  7   ----------------------------<  126<H>  4.4   |  19<L>  |  0.58    Ca    9.5      03 Aug 2021 06:13  Mg     1.9     08-03    TPro  6.0  /  Alb  3.8  /  TBili  0.4  /  DBili  x   /  AST  14  /  ALT  14  /  AlkPhos  116  08-02      MEDICATIONS  (STANDING):  amLODIPine   Tablet 2.5 milliGRAM(s) Oral daily  bisacodyl 5 milliGRAM(s) Oral every 12 hours  citalopram 20 milliGRAM(s) Oral daily  dexAMETHasone     Tablet 4 milliGRAM(s) Oral every 8 hours  famotidine    Tablet 20 milliGRAM(s) Oral two times a day  heparin   Injectable 5000 Unit(s) SubCutaneous every 8 hours  losartan 50 milliGRAM(s) Oral daily  ondansetron Injectable 4 milliGRAM(s) IV Push every 6 hours  pantoprazole  Injectable 40 milliGRAM(s) IV Push every 12 hours  polyethylene glycol 3350 17 Gram(s) Oral daily  senna 2 Tablet(s) Oral at bedtime  sodium chloride 0.9% lock flush 3 milliLiter(s) IV Push every 8 hours  sodium chloride 0.9%. 1000 milliLiter(s) (75 mL/Hr) IV Continuous <Continuous>    MEDICATIONS  (PRN):  acetaminophen   Tablet .. 650 milliGRAM(s) Oral every 6 hours PRN Severe Pain (7 - 10)  aluminum hydroxide/magnesium hydroxide/simethicone Suspension 30 milliLiter(s) Oral every 4 hours PRN Dyspepsia  clonazePAM  Tablet 1 milliGRAM(s) Oral at bedtime PRN agitation/ insomnia  cyclobenzaprine 10 milliGRAM(s) Oral three times a day PRN Muscle Spasm  SUMAtriptan 100 milliGRAM(s) Oral daily PRN Migraine  zolpidem 5 milliGRAM(s) Oral at bedtime PRN Insomnia        RADIOLOGY & ADDITIONAL TESTS:       `Patient discussed on morning rounds with Dr. Baker.      Operation / Date: Recurrent Hiatal hernia    SUBJECTIVE ASSESSMENT:  66y Female seen and examined at bedside. Reports feeling much better today and has not been nauseous or vomited since yesterday afternoon. She describes a constant pressure-like 4/10 diffuse abdominal pain for which the Tylenol does not help, but was able to eat some jello this morning. Patient is also endorsing anxiety about possible surgery tomorrow. Denies chest pain, shortness of breath, calf tenderness, lower extremity edema.      Vital Signs Last 24 Hrs  T(C): 36.2 (03 Aug 2021 09:14), Max: 36.4 (02 Aug 2021 21:27)  T(F): 97.2 (03 Aug 2021 09:14), Max: 97.6 (02 Aug 2021 21:27)  HR: 86 (03 Aug 2021 09:55) (76 - 98)  BP: 158/60 (03 Aug 2021 09:55) (122/73 - 158/60)  BP(mean): 86 (03 Aug 2021 09:55) (86 - 100)  RR: 20 (03 Aug 2021 09:55) (16 - 20)  SpO2: 96% (03 Aug 2021 09:55) (96% - 98%)    I&O's Detail    02 Aug 2021 07:01  -  03 Aug 2021 07:00  --------------------------------------------------------  IN:    Oral Fluid: 620 mL    sodium chloride 0.9%: 1725 mL  Total IN: 2345 mL    OUT:    Voided (mL): 1250 mL  Total OUT: 1250 mL    Total NET: 1095 mL      CHEST TUBE:  No.    ANTHONY DRAIN:  No.  EPICARDIAL WIRES: No.  TIE DOWNS: No.  POSADAS: No.    PHYSICAL EXAM:    GEN: NAD, looks comfortable  Psych: Anxious mood.  Neuro: A&Ox3.  No focal deficits.  Moving all extremities.   HEENT: No obvious abnormalities  CV: S1S2, regular, no murmurs appreciated.  No carotid bruits.  No JVD.  Lungs: Clear B/L.  No wheezing, rales or rhonchi.  ABD: Soft, non-distended with diffuse tenderness.  EXT: Warm and well perfused.  No peripheral edema noted.  Musculoskeletal: Moving all extremities with normal ROM, no joint swelling.  PV: DP/PT 2+    LABS:                        12.4   4.43  )-----------( 239      ( 03 Aug 2021 06:14 )             35.3       COUMADIN:  No. REASON: no indications for AC.        08-03    137  |  103  |  7   ----------------------------<  126<H>  4.4   |  19<L>  |  0.58    Ca    9.5      03 Aug 2021 06:13  Mg     1.9     08-03    TPro  6.0  /  Alb  3.8  /  TBili  0.4  /  DBili  x   /  AST  14  /  ALT  14  /  AlkPhos  116  08-02      MEDICATIONS  (STANDING):  amLODIPine   Tablet 2.5 milliGRAM(s) Oral daily  bisacodyl 5 milliGRAM(s) Oral every 12 hours  citalopram 20 milliGRAM(s) Oral daily  dexAMETHasone     Tablet 4 milliGRAM(s) Oral every 8 hours  famotidine    Tablet 20 milliGRAM(s) Oral two times a day  heparin   Injectable 5000 Unit(s) SubCutaneous every 8 hours  losartan 50 milliGRAM(s) Oral daily  ondansetron Injectable 4 milliGRAM(s) IV Push every 6 hours  pantoprazole  Injectable 40 milliGRAM(s) IV Push every 12 hours  polyethylene glycol 3350 17 Gram(s) Oral daily  senna 2 Tablet(s) Oral at bedtime  sodium chloride 0.9% lock flush 3 milliLiter(s) IV Push every 8 hours  sodium chloride 0.9%. 1000 milliLiter(s) (75 mL/Hr) IV Continuous <Continuous>    MEDICATIONS  (PRN):  acetaminophen   Tablet .. 650 milliGRAM(s) Oral every 6 hours PRN Severe Pain (7 - 10)  aluminum hydroxide/magnesium hydroxide/simethicone Suspension 30 milliLiter(s) Oral every 4 hours PRN Dyspepsia  clonazePAM  Tablet 1 milliGRAM(s) Oral at bedtime PRN agitation/ insomnia  cyclobenzaprine 10 milliGRAM(s) Oral three times a day PRN Muscle Spasm  SUMAtriptan 100 milliGRAM(s) Oral daily PRN Migraine  zolpidem 5 milliGRAM(s) Oral at bedtime PRN Insomnia        RADIOLOGY & ADDITIONAL TESTS:       Patient discussed on morning rounds with Dr. Baker.    Reason for admission: Recurrent Hiatal hernia, intractable vomiting    SUBJECTIVE ASSESSMENT:  66y Female seen and examined at bedside. Reports feeling much better today and has not been nauseous or vomited since yesterday afternoon. She describes a constant pressure-like 4/10 diffuse abdominal pain for which the Tylenol does not help, but was able to eat some jello this morning without emesis. Patient is also endorsing anxiety about possible surgery tomorrow. Denies chest pain, shortness of breath, calf tenderness, lower extremity edema.    Vital Signs Last 24 Hrs  T(C): 36.2 (03 Aug 2021 09:14), Max: 36.4 (02 Aug 2021 21:27)  T(F): 97.2 (03 Aug 2021 09:14), Max: 97.6 (02 Aug 2021 21:27)  HR: 86 (03 Aug 2021 09:55) (76 - 98)  BP: 158/60 (03 Aug 2021 09:55) (122/73 - 158/60)  BP(mean): 86 (03 Aug 2021 09:55) (86 - 100)  RR: 20 (03 Aug 2021 09:55) (16 - 20)  SpO2: 96% (03 Aug 2021 09:55) (96% - 98%)    I&O's Detail    02 Aug 2021 07:01  -  03 Aug 2021 07:00  --------------------------------------------------------  IN:    Oral Fluid: 620 mL    sodium chloride 0.9%: 1725 mL  Total IN: 2345 mL    OUT:    Voided (mL): 1250 mL  Total OUT: 1250 mL    Total NET: 1095 mL    CHEST TUBE:  No.    ANTHONY DRAIN:  No.  EPICARDIAL WIRES: No.  TIE DOWNS: No.  POSADAS: No.    PHYSICAL EXAM:    GEN: NAD, looks comfortable  Psych: Anxious mood.  Neuro: A&Ox3.  No focal deficits.  Moving all extremities.   HEENT: No obvious abnormalities  CV: S1S2, regular, no murmurs appreciated.  No carotid bruits.  No JVD.  Lungs: Clear B/L.  No wheezing, rales or rhonchi.  ABD: Soft, non-distended with diffuse tenderness.  EXT: Warm and well perfused.  No peripheral edema noted.  Musculoskeletal: Moving all extremities with normal ROM, no joint swelling.  PV: DP/PT 2+    LABS:                        12.4   4.43  )-----------( 239      ( 03 Aug 2021 06:14 )             35.3       COUMADIN:  No. REASON: no indications for AC.    08-03    137  |  103  |  7   ----------------------------<  126<H>  4.4   |  19<L>  |  0.58    Ca    9.5      03 Aug 2021 06:13  Mg     1.9     08-03    TPro  6.0  /  Alb  3.8  /  TBili  0.4  /  DBili  x   /  AST  14  /  ALT  14  /  AlkPhos  116  08-02      MEDICATIONS  (STANDING):  amLODIPine   Tablet 2.5 milliGRAM(s) Oral daily  bisacodyl 5 milliGRAM(s) Oral every 12 hours  citalopram 20 milliGRAM(s) Oral daily  dexAMETHasone     Tablet 4 milliGRAM(s) Oral every 8 hours  famotidine    Tablet 20 milliGRAM(s) Oral two times a day  heparin   Injectable 5000 Unit(s) SubCutaneous every 8 hours  losartan 50 milliGRAM(s) Oral daily  ondansetron Injectable 4 milliGRAM(s) IV Push every 6 hours  pantoprazole  Injectable 40 milliGRAM(s) IV Push every 12 hours  polyethylene glycol 3350 17 Gram(s) Oral daily  senna 2 Tablet(s) Oral at bedtime  sodium chloride 0.9% lock flush 3 milliLiter(s) IV Push every 8 hours  sodium chloride 0.9%. 1000 milliLiter(s) (75 mL/Hr) IV Continuous <Continuous>    MEDICATIONS  (PRN):  acetaminophen   Tablet .. 650 milliGRAM(s) Oral every 6 hours PRN Severe Pain (7 - 10)  aluminum hydroxide/magnesium hydroxide/simethicone Suspension 30 milliLiter(s) Oral every 4 hours PRN Dyspepsia  clonazePAM  Tablet 1 milliGRAM(s) Oral at bedtime PRN agitation/ insomnia  cyclobenzaprine 10 milliGRAM(s) Oral three times a day PRN Muscle Spasm  SUMAtriptan 100 milliGRAM(s) Oral daily PRN Migraine  zolpidem 5 milliGRAM(s) Oral at bedtime PRN Insomnia      RADIOLOGY & ADDITIONAL TESTS:  < from: CT Abdomen and Pelvis w/ Oral Cont and w/wo IV Cont (07.29.21 @ 16:18) >  IMPRESSION:    Redemonstrated partial herniation of fundoplication wrap into chest with slippage.    < end of copied text >

## 2021-08-03 NOTE — PROGRESS NOTE ADULT - ASSESSMENT
66 year old female, former smoker, quit in January after developing Covid, with a PMHx of HTN, TIA 7yrs ago, COVID 1/21, IBS, migraines, chronic neck and back pain, cholecystectomy, and hiatal hernia repair (2010), now with a recurrent hernia. She presented to Dr. Baker's office to review esophagram and gastric emptying study. In office patient states she has been very nauseous with severe abdominal pain, vomiting multiple times during the day. Patient appeared unwell and pale and was referred to the ED For hydration, blood work, and CT Abdomen with IV/Oral contrast which confirmed hernia. Patient underwent EGD with GI 7/30 that revealed gastritits however was not completed 2/2 pt becoming apneic and hypoxic. Patient kept NPO, then transitioned to clear liquid diet. Patient tolerating sips of water, however refusing to try clears and advance her diet. Patient with 1 episode of emesis overnight. GI signed off with PPI recommendations and no further workup or evaluation. Patient went for CT head with c/f underlying process causing nausea/vomiting; CT head revealed ~11mm right frontal convexity calcified meningioma. Patient endorsing a panic attack 8/2, however simultaneously refusing medications.    ===Neurovascular===  -No delirium. Pain moderately controlled with current regimen.  -Continue PRN Tylenol for pain  -Continue PRN sumatriptan for migraines  -Continue citalopram for anxiety  -Continue PRN clonazepam for anxiety attacks  -Continue home zolpidem for insomnia  -Neurology and neurosurgery following for meningioma    ===Cardiovascular===  -Hemodynamically stable.   -Continue home amlodipine. HR controlled at 70-80's.  -Continue home losartan. BP: 120-150's/80-90's.    ===Respiratory===   -Encourage Cough, deep breathing and Use of IS 10x / hr while awake.  -Re-educated on IS today, currently pulling at 1250ml.  -Chest PT 4xdaily    ===GI===  PMHx Hiatial Hernia repair 2010 with recurrent herniation      - CT completed 7/29, revealed hiatal hernia     - EGD 7/30, revealed esophagitis, likely 2/2 hiatal hernia  -protonix 40mg IV BID for GI protection  -Continue scheduled bisacodyl, famotidine, senna, miralax for bowel regimen  -Continue ondansetron and dexamethasone for N/V  -Encouraged PO fluids, IV Hydration as needed  -advance to pureed diet for lunch. Monitor for N/V.  -last BM: 8/3, watery      ===Renal / ===  BUN/Cr Stable: 8/0.6  -Continue to monitor I/O's.    ===Endocrine===  Blood sugar stable      ===Hematologic===  H/H stable: 11.5/33.5  -DVT prophylaxis with Heparin sq    ===ID===  -Afebrile  -WBC 4.43  -Continue to observe for SIRS/Sepsis Syndrome.    ===Disposition===  - OR vs d/c with outpatient followup   66 year old female, former smoker, quit in January after developing Covid, with a PMHx of HTN, TIA 7yrs ago, COVID , IBS, migraines, chronic neck and back pain, cholecystectomy, and hiatal hernia repair (), now with a recurrent hernia. She presented to Dr. Baker's office to review esophagram and gastric emptying study. In office patient states she has been very nauseous with severe abdominal pain, vomiting multiple times during the day. Patient appeared unwell and pale and was referred to the ED For hydration, blood work, and CT Abdomen with IV/Oral contrast which confirmed hernia. Patient underwent EGD with GI  that revealed gastritits however was not completed 2/2 pt becoming apneic and hypoxic. Patient kept NPO, then transitioned to clear liquid diet. Patient tolerating sips of water, however refusing to try clears and advance her diet. Patient with 1 episode of emesis overnight. GI signed off with PPI recommendations and h.pylori ab test and no further workup or evaluation. Patient went for CT head with c/f underlying process causing nausea/vomiting; CT head revealed ~11mm right frontal convexity calcified meningioma. Patient endorsed a panic attack , however simultaneously refusing medications. Advancing diet to puree to assess need for OR  for laparoscopic, robotic reoperative hiatal hernia repair w/EGD vs. outpatient follow with scheduled elective surgery.    ===Neurovascular===  -No delirium. Pain moderately controlled with current regimen.  -Continue PRN Tylenol for pain  -Continue PRN sumatriptan for migraines  -Continue citalopram for anxiety  -Continue PRN clonazepam for anxiety attacks  -Continue home zolpidem for insomnia  -Neurology and neurosurgery following for meningioma    ===Cardiovascular===  -Hemodynamically stable.   -Continue home amlodipine. HR controlled at 70-80's.  -Continue home losartan. BP: 120-150's/80-90's.    ===Respiratory===   -Encourage Cough, deep breathing and Use of IS 10x / hr while awake.  -Re-educated on IS today, currently pulling at 1250ml.  -Chest PT 4xdaily    ===GI===  -PMHx Hiatial Hernia repair  with recurrent herniation      -CT completed , revealed hiatal hernia     -EGD , revealed esophagitis, likely 2/2 hiatal hernia     -advance to pureed diet for lunch. Monitor for toleration. If not, OR  for laparoscopic, robotic reoperative hiatal hernia repair w/EGD.  -protonix 40mg IV BID for GI protection  -Continue scheduled bisacodyl, senna, miralax for bowel regimen  -Continue famotidine, ondansetron and dexamethasone for N/V  -Encouraged PO fluids, IV Hydration as needed  -last BM: 8/3, watery  -f/u H.pylori antibodies and need for quadruple therapy, per GI recs      ===Renal / ===  -BUN/Cr Stable: 7/0.58  -Continue to monitor I/O's.    ===Endocrine===  -B-120's  -monitor for hypoglycemia     ===Hematologic===  -H/H stable: 12.4/35.3  -DVT prophylaxis with Heparin sq    ===ID===  -Afebrile  -WBC 4.43  -Continue to observe for SIRS/Sepsis Syndrome.    ===Disposition===  - OR vs d/c with outpatient followup   A/P:    66 year old female, former smoker (quit 1/2021), with a PMHx of HTN, TIA 7yrs ago, COVID 1/21, IBS, migraines, chronic neck and back pain, cholecystectomy, and hiatal hernia repair (2010), now with a recurrent hernia. She presented to Dr. Baker's office 7/29/21 to review esophagram and gastric emptying study and in the office complained of severe abdominal pain, nausea, and vomiting. She was referred to the ED and underwent CT abdomen with IV/Oral contrast which confirmed her recurrent hiatal hernia. On 7/30 she underwent EGD with GI which revealed gastritis but was aborted when patient became apneic and hypoxic. CT head obtained which revealed 11 mm right frontal cavity convexity calcified meningioma. Neurology consulted, per evaluation nausea, vomiting not likely caused by meningioma. Anesthesia also consulted for management of nausea and pepcid and decadron were added. She was advanced to a clear liquid and then a puree diet with improvement of symptoms.     Neurovascular:   - No delirium. Pain well controlled with current regimen.  -Continue tylenol PRN  - Hx migraines, continue sumatriptan 100 mg PRN  - Hx muscle spasms: continue flexeril 10 mg TID  - Hx insomnia: continue ambien 5 mg daily  - Mood disorder: continue clonazepam 1 mg daily, citalopram 20 mg daily    Cardiovascular:   -Hemodynamically stable. HR controlled (76-98)  - Hx of HTN, BP controlled (122//89), continue norvasc 2.5 mg QD, losartan 50 mg QD    Respiratory:   - 02 Sat = 98% on RA.  -If on oxygen wean to RA from for O2 Sat > 93%.  -Encourage C+DB and Use of IS 10x / hr while awake.  -CXR without effusions     GI:   -PMHx Hiatial Hernia repair 2010 with recurrent hiatal hernia and significant nausea/vomiting      -CT completed 7/29, revealed hiatal hernia     -EGD 7/30, revealed esophagitis, likely 2/2 hiatal hernia     -Possible OR for reop hiatal hernia repair 8/4  - Diet advanced to puree   -protonix 40mg IV BID for GI protection  -Continue scheduled bisacodyl, senna, miralax for bowel regimen  -Continue famotidine, ondansetron and dexamethasone for N/V  -f/u H.pylori antibodies and need for quadruple therapy, per GI recs    Renal / :  - BUN/Cr stable at 7/0.58  -Continue to monitor renal function.  -Monitor I/O's.  - Replete electrolytes PRN    Endocrine:    - No known hx DM or thyroid disease     Hematologic:  -H/H stable at 12.4/35.3 with no obvious signs of bleeding  -Coagulation Panel.    ID:  -Pt remains afebrile with no elevation in WBC or signs of infection  -Continue to monitor CBC  -Observe for SIRS/Sepsis Syndrome.    Prophylaxis:  -DVT prophylaxis with 5000 SubQ Heparin q8h.  -SCD's    Disposition:  - Possible OR this admission

## 2021-08-04 ENCOUNTER — TRANSCRIPTION ENCOUNTER (OUTPATIENT)
Age: 66
End: 2021-08-04

## 2021-08-04 VITALS — TEMPERATURE: 98 F

## 2021-08-04 PROBLEM — E78.5 HYPERLIPIDEMIA, UNSPECIFIED: Chronic | Status: ACTIVE | Noted: 2021-07-29

## 2021-08-04 PROBLEM — I10 ESSENTIAL (PRIMARY) HYPERTENSION: Chronic | Status: ACTIVE | Noted: 2021-07-29

## 2021-08-04 PROBLEM — Z86.16 PERSONAL HISTORY OF COVID-19: Chronic | Status: ACTIVE | Noted: 2021-07-29

## 2021-08-04 PROBLEM — Z86.73 PERSONAL HISTORY OF TRANSIENT ISCHEMIC ATTACK (TIA), AND CEREBRAL INFARCTION WITHOUT RESIDUAL DEFICITS: Chronic | Status: ACTIVE | Noted: 2021-07-29

## 2021-08-04 LAB
ANION GAP SERPL CALC-SCNC: 13 MMOL/L — SIGNIFICANT CHANGE UP (ref 5–17)
APTT BLD: 32.1 SEC — SIGNIFICANT CHANGE UP (ref 27.5–35.5)
BUN SERPL-MCNC: 14 MG/DL — SIGNIFICANT CHANGE UP (ref 7–23)
CALCIUM SERPL-MCNC: 9.8 MG/DL — SIGNIFICANT CHANGE UP (ref 8.4–10.5)
CHLORIDE SERPL-SCNC: 105 MMOL/L — SIGNIFICANT CHANGE UP (ref 96–108)
CO2 SERPL-SCNC: 24 MMOL/L — SIGNIFICANT CHANGE UP (ref 22–31)
CREAT SERPL-MCNC: 0.71 MG/DL — SIGNIFICANT CHANGE UP (ref 0.5–1.3)
GLUCOSE SERPL-MCNC: 163 MG/DL — HIGH (ref 70–99)
HCT VFR BLD CALC: 34.7 % — SIGNIFICANT CHANGE UP (ref 34.5–45)
HGB BLD-MCNC: 12 G/DL — SIGNIFICANT CHANGE UP (ref 11.5–15.5)
INR BLD: 0.97 — SIGNIFICANT CHANGE UP (ref 0.88–1.16)
MAGNESIUM SERPL-MCNC: 2 MG/DL — SIGNIFICANT CHANGE UP (ref 1.6–2.6)
MCHC RBC-ENTMCNC: 32.7 PG — SIGNIFICANT CHANGE UP (ref 27–34)
MCHC RBC-ENTMCNC: 34.6 GM/DL — SIGNIFICANT CHANGE UP (ref 32–36)
MCV RBC AUTO: 94.6 FL — SIGNIFICANT CHANGE UP (ref 80–100)
NRBC # BLD: 0 /100 WBCS — SIGNIFICANT CHANGE UP (ref 0–0)
PLATELET # BLD AUTO: 269 K/UL — SIGNIFICANT CHANGE UP (ref 150–400)
POTASSIUM SERPL-MCNC: 4.2 MMOL/L — SIGNIFICANT CHANGE UP (ref 3.5–5.3)
POTASSIUM SERPL-SCNC: 4.2 MMOL/L — SIGNIFICANT CHANGE UP (ref 3.5–5.3)
PROTHROM AB SERPL-ACNC: 11.7 SEC — SIGNIFICANT CHANGE UP (ref 10.6–13.6)
RBC # BLD: 3.67 M/UL — LOW (ref 3.8–5.2)
RBC # FLD: 13.2 % — SIGNIFICANT CHANGE UP (ref 10.3–14.5)
SODIUM SERPL-SCNC: 142 MMOL/L — SIGNIFICANT CHANGE UP (ref 135–145)
WBC # BLD: 6.69 K/UL — SIGNIFICANT CHANGE UP (ref 3.8–10.5)
WBC # FLD AUTO: 6.69 K/UL — SIGNIFICANT CHANGE UP (ref 3.8–10.5)

## 2021-08-04 PROCEDURE — 85610 PROTHROMBIN TIME: CPT

## 2021-08-04 PROCEDURE — G1004: CPT

## 2021-08-04 PROCEDURE — 70450 CT HEAD/BRAIN W/O DYE: CPT

## 2021-08-04 PROCEDURE — 71046 X-RAY EXAM CHEST 2 VIEWS: CPT

## 2021-08-04 PROCEDURE — 74240 X-RAY XM UPR GI TRC 1CNTRST: CPT

## 2021-08-04 PROCEDURE — 80048 BASIC METABOLIC PNL TOTAL CA: CPT

## 2021-08-04 PROCEDURE — 83690 ASSAY OF LIPASE: CPT

## 2021-08-04 PROCEDURE — 86850 RBC ANTIBODY SCREEN: CPT

## 2021-08-04 PROCEDURE — 83735 ASSAY OF MAGNESIUM: CPT

## 2021-08-04 PROCEDURE — 96375 TX/PRO/DX INJ NEW DRUG ADDON: CPT

## 2021-08-04 PROCEDURE — 86677 HELICOBACTER PYLORI ANTIBODY: CPT

## 2021-08-04 PROCEDURE — 80053 COMPREHEN METABOLIC PANEL: CPT

## 2021-08-04 PROCEDURE — 85025 COMPLETE CBC W/AUTO DIFF WBC: CPT

## 2021-08-04 PROCEDURE — U0003: CPT

## 2021-08-04 PROCEDURE — 99285 EMERGENCY DEPT VISIT HI MDM: CPT | Mod: 25

## 2021-08-04 PROCEDURE — 99239 HOSP IP/OBS DSCHRG MGMT >30: CPT

## 2021-08-04 PROCEDURE — U0005: CPT

## 2021-08-04 PROCEDURE — 85027 COMPLETE CBC AUTOMATED: CPT

## 2021-08-04 PROCEDURE — 86901 BLOOD TYPING SEROLOGIC RH(D): CPT

## 2021-08-04 PROCEDURE — 74178 CT ABD&PLV WO CNTR FLWD CNTR: CPT | Mod: MG

## 2021-08-04 PROCEDURE — 86803 HEPATITIS C AB TEST: CPT

## 2021-08-04 PROCEDURE — 74220 X-RAY XM ESOPHAGUS 1CNTRST: CPT

## 2021-08-04 PROCEDURE — 96374 THER/PROPH/DIAG INJ IV PUSH: CPT

## 2021-08-04 PROCEDURE — 86900 BLOOD TYPING SEROLOGIC ABO: CPT

## 2021-08-04 PROCEDURE — 36415 COLL VENOUS BLD VENIPUNCTURE: CPT

## 2021-08-04 PROCEDURE — 93005 ELECTROCARDIOGRAM TRACING: CPT

## 2021-08-04 PROCEDURE — 85730 THROMBOPLASTIN TIME PARTIAL: CPT

## 2021-08-04 PROCEDURE — 86769 SARS-COV-2 COVID-19 ANTIBODY: CPT

## 2021-08-04 PROCEDURE — 96376 TX/PRO/DX INJ SAME DRUG ADON: CPT

## 2021-08-04 PROCEDURE — 71045 X-RAY EXAM CHEST 1 VIEW: CPT

## 2021-08-04 RX ORDER — PANTOPRAZOLE SODIUM 20 MG/1
1 TABLET, DELAYED RELEASE ORAL
Qty: 60 | Refills: 0
Start: 2021-08-04 | End: 2021-09-02

## 2021-08-04 RX ORDER — DEXAMETHASONE 0.5 MG/5ML
1 ELIXIR ORAL
Qty: 90 | Refills: 0
Start: 2021-08-04 | End: 2021-09-02

## 2021-08-04 RX ORDER — FAMOTIDINE 10 MG/ML
1 INJECTION INTRAVENOUS
Qty: 60 | Refills: 0
Start: 2021-08-04 | End: 2021-09-02

## 2021-08-04 RX ADMIN — HEPARIN SODIUM 5000 UNIT(S): 5000 INJECTION INTRAVENOUS; SUBCUTANEOUS at 05:37

## 2021-08-04 RX ADMIN — LOSARTAN POTASSIUM 50 MILLIGRAM(S): 100 TABLET, FILM COATED ORAL at 05:34

## 2021-08-04 RX ADMIN — PANTOPRAZOLE SODIUM 40 MILLIGRAM(S): 20 TABLET, DELAYED RELEASE ORAL at 05:34

## 2021-08-04 RX ADMIN — FAMOTIDINE 20 MILLIGRAM(S): 10 INJECTION INTRAVENOUS at 05:34

## 2021-08-04 RX ADMIN — AMLODIPINE BESYLATE 2.5 MILLIGRAM(S): 2.5 TABLET ORAL at 05:34

## 2021-08-04 RX ADMIN — Medication 4 MILLIGRAM(S): at 05:34

## 2021-08-04 RX ADMIN — SODIUM CHLORIDE 3 MILLILITER(S): 9 INJECTION INTRAMUSCULAR; INTRAVENOUS; SUBCUTANEOUS at 05:56

## 2021-08-04 RX ADMIN — ONDANSETRON 4 MILLIGRAM(S): 8 TABLET, FILM COATED ORAL at 05:34

## 2021-08-04 NOTE — DISCHARGE NOTE NURSING/CASE MANAGEMENT/SOCIAL WORK - PATIENT PORTAL LINK FT
You can access the FollowMyHealth Patient Portal offered by [facility] by registering at the following website: [email or web address]. By joining [employer]’s FollowMyHealth portal, you will also be able to view your health information using other applications (apps) compatible with our system.

## 2021-08-04 NOTE — DISCHARGE NOTE PROVIDER - HOSPITAL COURSE
66 year old female, former smoker (quit 1/2021), with a PMHx of HTN, TIA 7yrs ago, COVID 1/21, IBS, migraines, chronic neck and back pain, cholecystectomy, and hiatal hernia repair (2010), now with a recurrent hernia. She presented to Dr. Baker's office 7/29/21 to review esophagram and gastric emptying study and in the office complained of severe abdominal pain, nausea, and vomiting. She was referred to the ED and underwent CT abdomen with IV/Oral contrast which confirmed her recurrent hiatal hernia. On 7/30 she underwent EGD with GI which revealed gastritis but was aborted when patient became apneic and hypoxic. CT head obtained which revealed 11 mm right frontal cavity convexity calcified meningioma. Neurology consulted, per evaluation nausea, vomiting not likely caused by meningioma. Anesthesia also consulted for management of nausea and pepcid and decadron were added. She was advanced to a clear liquid and then a puree diet with improvement of symptoms. She is now stable on new regimen and tolerating puree diet. At this time she is now stable for discharge to home on puree diet with close follow up with the thoracic surgery team.     Over 35 minutes was spent with the patient reviewing the discharge material including medications, follow up appointments, recovery, concerning symptoms, and how to contact their health care providers if they have questions

## 2021-08-04 NOTE — DISCHARGE NOTE PROVIDER - CARE PROVIDERS DIRECT ADDRESSES
[email or web address]

## 2021-08-04 NOTE — DISCHARGE NOTE PROVIDER - NSDCFUADDAPPT_GEN_ALL_CORE_FT
- The office will contact you with your follow up appointments. You can call our office at [phone]

## 2021-08-04 NOTE — DISCHARGE NOTE PROVIDER - NSDCACTIVITY_GEN_ALL_CORE
Showering allowed/Stairs allowed/Driving allowed/Walking - Indoors allowed/Walking - Outdoors allowed

## 2021-08-04 NOTE — DISCHARGE NOTE PROVIDER - NSDCMRMEDTOKEN_GEN_ALL_CORE_FT
amLODIPine 2.5 mg oral tablet: 1 tab(s) orally once a day  cetirizine 10 mg oral tablet: 1 tab(s) orally once a day  citalopram 20 mg oral tablet: 1 tab(s) orally once a day  clonazePAM 1 mg oral tablet: orally once a day (at bedtime), As Needed  cyclobenzaprine 10 mg oral tablet: 1 tab(s) orally 3 times a day, As Needed  Dexilant 60 mg oral delayed release capsule: 1 cap(s) orally once a day  dicyclomine 20 mg oral tablet: 1 tab(s) orally 4 times a day, As Needed  Imitrex 100 mg oral tablet: 1 tab(s) orally once, As Needed  losartan 50 mg oral tablet: 1 tab(s) orally once a day  Medrol Dosepak 4 mg oral tablet: 1 packet(s) orally once a day (at bedtime)   Protonix 40 mg oral delayed release tablet: 1 tab(s) orally 2 times a day   Zofran 4 mg oral tablet: 1 tab(s) orally every 8 hours, As Needed  zolpidem 10 mg oral tablet: 1 tab(s) orally once a day (at bedtime)

## 2021-08-04 NOTE — DISCHARGE NOTE PROVIDER - NSDCCPCAREPLAN_GEN_ALL_CORE_FT
PRINCIPAL DISCHARGE DIAGNOSIS  Diagnosis: Epigastric abdominal pain  Assessment and Plan of Treatment:       SECONDARY DISCHARGE DIAGNOSES  Diagnosis: Hiatal hernia  Assessment and Plan of Treatment:

## 2021-08-04 NOTE — PROGRESS NOTE ADULT - PROVIDER SPECIALTY LIST ADULT
Gastroenterology
Thoracic Surgery
Gastroenterology

## 2021-08-04 NOTE — DISCHARGE NOTE PROVIDER - CARE PROVIDER_API CALL
Jacky Baker)  Surgery; Thoracic Surgery  [address] [place] [address]  Phone: [phone]  Fax: [phone]  Follow Up Time:     Aidan Cordova)  Neurosurgery  [address], [address] [place] [address]  Phone: [phone]  Fax: [phone]  Follow Up Time:     David Rollins)  Gastroenterology; Internal Medicine  [address] [place] [id number]  Phone: [phone]  Fax: [phone]  Follow Up Time:

## 2021-08-04 NOTE — DISCHARGE NOTE PROVIDER - PROVIDER TOKENS
PROVIDER:[TOKEN:[[id number]]],PROVIDER:[TOKEN:[[id number]]],PROVIDER:[TOKEN:[[id number]]]

## 2021-08-04 NOTE — DISCHARGE NOTE NURSING/CASE MANAGEMENT/SOCIAL WORK - NSDCFUADDAPPT_GEN_ALL_CORE_FT
- The office will contact you with your follow up appointments. You can call our office at [phone]

## 2021-08-04 NOTE — PROGRESS NOTE ADULT - REASON FOR ADMISSION
Hiatal Hernia

## 2021-08-04 NOTE — PROGRESS NOTE ADULT - SUBJECTIVE AND OBJECTIVE BOX
Patient discussed on morning rounds with Dr. Baker    Reason for admission: Recurrent hiatal hernia, nausea/vomiting    Surgeon: Dr. Baker    Referring Physician: Dr. Rollins    SUBJECTIVE ASSESSMENT:  66y Female assessed at bedside this morning. Patient states she feels well, denies nausea/ emesis today and tolerated puree diet yesterday. Does feel nervous about going home and having a recurrence of her symptoms. Denies headache, fever, chills, chest pain, SOB.     HOSPITAL COURSE:  66 year old female, former smoker (quit 1/2021), with a PMHx of HTN, TIA 7yrs ago, COVID 1/21, IBS, migraines, chronic neck and back pain, cholecystectomy, and hiatal hernia repair (2010), now with a recurrent hernia. She presented to Dr. Baker's office 7/29/21 to review esophagram and gastric emptying study and in the office complained of severe abdominal pain, nausea, and vomiting. She was referred to the ED and underwent CT abdomen with IV/Oral contrast which confirmed her recurrent hiatal hernia. On 7/30 she underwent EGD with GI which revealed gastritis but was aborted when patient became apneic and hypoxic. CT head obtained which revealed 11 mm right frontal cavity convexity calcified meningioma. Neurology consulted, per evaluation nausea, vomiting not likely caused by meningioma. Anesthesia also consulted for management of nausea and pepcid and decadron were added. She was advanced to a clear liquid and then a puree diet with improvement of symptoms. She is now stable on new regimen and tolerating puree diet. At this time she is now stable for discharge to home on puree diet with close follow up with the thoracic surgery team.     Over 35 minutes was spent with the patient reviewing the discharge material including medications, follow up appointments, recovery, concerning symptoms, and how to contact their health care providers if they have questions      Vital Signs Last 24 Hrs  T(C): 36.1 (04 Aug 2021 04:46), Max: 36.5 (03 Aug 2021 13:35)  T(F): 97 (04 Aug 2021 04:46), Max: 97.7 (03 Aug 2021 13:35)  HR: 68 (04 Aug 2021 06:48) (66 - 108)  BP: 111/61 (04 Aug 2021 06:48) (93/51 - 158/60)  BP(mean): 81 (04 Aug 2021 06:48) (64 - 86)  RR: 18 (04 Aug 2021 06:48) (16 - 20)  SpO2: 95% (04 Aug 2021 06:48) (94% - 97%)    EPICARDIAL WIRES REMOVED: N/A.  TIE DOWNS REMOVED: N/A.    Physical Exam  CONSTITUTIONAL: Well appearing in NAD assessed laying comfortably in bed   NEURO: A&OX3. No focal deficits noted, moving bilateral upper and lower extremities                    CV: RRR, no murmurs, rubs, gallops  RESPIRATORY: Clear to auscultation bilateral posterior lung fields, no wheezes, rales, rhonchi   GI: +BS, ND, diffuse tenderness   MUSKULOSKELETAL: No peripheral edema or calf tenderness. Full strength and ROM bilateral upper and lower extremities   VASCULAR: Bilateral distal pulses 2+  INCISIONS: None    LABS:                        12.0   6.69  )-----------( 269      ( 04 Aug 2021 06:59 )             34.7       COUMADIN:  No    PT/INR - ( 04 Aug 2021 06:59 )   PT: 11.7 sec;   INR: 0.97          PTT - ( 04 Aug 2021 06:59 )  PTT:32.1 sec    08-04    142  |  105  |  14  ----------------------------<  163<H>  4.2   |  24  |  0.71    Ca    9.8      04 Aug 2021 06:59  Mg     2.0     08-04    Discharge CXR:  < from: Xray Chest 1 View- PORTABLE-Routine (Xray Chest 1 View- PORTABLE-Routine in AM.) (08.03.21 @ 04:35) >  IMPRESSION: No acute cardiopulmonary disease process.    < end of copied text >    Med Reconciliation:  Medication Reconciliation Status	Admission Reconciliation is Completed  Discharge Reconciliation is Completed  Discharge Medications	amLODIPine 2.5 mg oral tablet: 1 tab(s) orally once a day  cetirizine 10 mg oral tablet: 1 tab(s) orally once a day  citalopram 20 mg oral tablet: 1 tab(s) orally once a day  clonazePAM 1 mg oral tablet: orally once a day (at bedtime), As Needed  cyclobenzaprine 10 mg oral tablet: 1 tab(s) orally 3 times a day, As Needed  Dexilant 60 mg oral delayed release capsule: 1 cap(s) orally once a day  dicyclomine 20 mg oral tablet: 1 tab(s) orally 4 times a day, As Needed  Imitrex 100 mg oral tablet: 1 tab(s) orally once, As Needed  losartan 50 mg oral tablet: 1 tab(s) orally once a day  Medrol Dosepak 4 mg oral tablet: 1 packet(s) orally once a day (at bedtime)   Protonix 40 mg oral delayed release tablet: 1 tab(s) orally 2 times a day   Zofran 4 mg oral tablet: 1 tab(s) orally every 8 hours, As Needed  zolpidem 10 mg oral tablet: 1 tab(s) orally once a day (at bedtime)  ,  ,     Care Plan/Procedures:  Discharge Diagnoses, Assessment and Plan of Treatment	PRINCIPAL DISCHARGE DIAGNOSIS  Diagnosis: Epigastric abdominal pain  Assessment and Plan of Treatment:       SECONDARY DISCHARGE DIAGNOSES  Diagnosis: Hiatal hernia  Assessment and Plan of Treatment:  Goal(s)	To get better and follow your care plan as instructed.     Follow Up:  Diet Instructions	-Puree diet  Activity	Driving allowed, Showering allowed, Stairs allowed, Walking - Indoors allowed, Walking - Outdoors allowed  Care Providers for Follow up (PCP/Outpatient Provider)	Jacky Baker)  Surgery; Thoracic Surgery  [address] [place] [address]  Phone: [phone]  Fax: [phone]  Follow Up Time:     Aidan Cordova)  Neurosurgery  [address], [address] [place] [address]  Phone: [phone]  Fax: [phone]  Follow Up Time:     David Rollins)  Gastroenterology; Internal Medicine  [address] [place] [address]  Phone: [phone]  Fax: [phone]  Follow Up Time:  Additional Scheduled Appointments	- The office will contact you with your follow up appointments. You can call our office at [phone]

## 2021-08-05 ENCOUNTER — NON-APPOINTMENT (OUTPATIENT)
Age: 66
End: 2021-08-05

## 2021-08-07 LAB — H PYLORI AB SER-ACNC: 8.9 UNITS — SIGNIFICANT CHANGE UP

## 2021-08-09 DIAGNOSIS — D32.0 BENIGN NEOPLASM OF CEREBRAL MENINGES: ICD-10-CM

## 2021-08-09 DIAGNOSIS — Z86.16 PERSONAL HISTORY OF COVID-19: ICD-10-CM

## 2021-08-09 DIAGNOSIS — K29.70 GASTRITIS, UNSPECIFIED, WITHOUT BLEEDING: ICD-10-CM

## 2021-08-09 DIAGNOSIS — R06.81 APNEA, NOT ELSEWHERE CLASSIFIED: ICD-10-CM

## 2021-08-09 DIAGNOSIS — K44.9 DIAPHRAGMATIC HERNIA WITHOUT OBSTRUCTION OR GANGRENE: ICD-10-CM

## 2021-08-09 DIAGNOSIS — E78.5 HYPERLIPIDEMIA, UNSPECIFIED: ICD-10-CM

## 2021-08-09 DIAGNOSIS — I10 ESSENTIAL (PRIMARY) HYPERTENSION: ICD-10-CM

## 2021-08-09 DIAGNOSIS — R09.02 HYPOXEMIA: ICD-10-CM

## 2021-08-09 DIAGNOSIS — K20.90 ESOPHAGITIS, UNSPECIFIED WITHOUT BLEEDING: ICD-10-CM

## 2021-08-12 ENCOUNTER — APPOINTMENT (OUTPATIENT)
Dept: THORACIC SURGERY | Facility: CLINIC | Age: 66
End: 2021-08-12

## 2021-08-12 NOTE — HISTORY OF PRESENT ILLNESS
[FreeTextEntry1] : 66 year old woman, former smoker (quit 1/2021), with a past medical history HTN, TIA 7 years ago, COVID 1/21, IBS, migraines, chronic neck and back pain who presented to [facility] ED on 7/29/21 with severe abdominal pain, nausea, and vomiting. She had CT abdomen with IV contrast done which confirmed recurrent hiatal hernia. On 7/30/21 she underwent EGD with GI which revealed gastritis but was aborted when patient became apneic and hypoxic. CT head done demonstrated 11 mm right frontal convexity calcified meningioma.\par She was discharged home on pureed diet with close follow up with thoracic team.\par \par She was advised to follow up with neurosurgery for meningioma and presents tot he office today for further discussion.

## 2021-08-12 NOTE — DATA REVIEWED
[de-identified] : \par EXAM: CT BRAIN\par \par PROCEDURE DATE: 08/01/2021\par \par \par \par INTERPRETATION: PROCEDURE: CT head without intravenous contrast\par \par INDICATION: Nausea and vomiting\par \par TECHNIQUE: Multiple axial images were obtained at 5 mm intervals from the skull base to the vertex. Sagittal and coronal reformatted images were obtained from the axial data set. The images were reviewed in brain and bone windows.\par \par COMPARISON: None\par \par FINDINGS: The CT examination demonstrates the ventricles, cisternal spaces, and cortical sulci to be within normal limits. There is no midline shift or extra axial collections. The gray white differentiation appears within normal limits. There is no intracranial hemorrhage or acute transcortical infarct. There is a approximately 11 mm lesion which is predominantly calcified along the right posterior frontal convexity which may represent a meningioma (series 4 image 28 and series 56618 image 38).\par \par The bony windows demonstrates no fractures. The visualized paranasal sinuses are within normal limits. The right mastoid air cells are underdeveloped. The left mastoid air cells are well aerated.\par \par Limited evaluation of the TMJs demonstrates degenerative changes with joint space narrowing with condylar head flattening (right greater than left).\par \par IMPRESSION: Approximately 11 mm right frontal convexity calcified meningioma.\par \par --- End of Report ---\par \par \par \par \par Thank you for the opportunity to participate in the care of this patient.\par \par \par STEVEN ALEXIS MD; Attending Radiologist\par This document has been electronically signed. Aug 1 2021 10:53AM

## 2021-08-16 ENCOUNTER — APPOINTMENT (OUTPATIENT)
Dept: NEUROSURGERY | Facility: CLINIC | Age: 66
End: 2021-08-16

## 2021-08-20 DIAGNOSIS — Z01.818 ENCOUNTER FOR OTHER PREPROCEDURAL EXAMINATION: ICD-10-CM

## 2021-08-23 ENCOUNTER — APPOINTMENT (OUTPATIENT)
Dept: DISASTER EMERGENCY | Facility: CLINIC | Age: 66
End: 2021-08-23

## 2021-08-24 ENCOUNTER — TRANSCRIPTION ENCOUNTER (OUTPATIENT)
Age: 66
End: 2021-08-24

## 2021-08-24 LAB — SARS-COV-2 N GENE NPH QL NAA+PROBE: NOT DETECTED

## 2021-08-24 RX ORDER — CYCLOBENZAPRINE HYDROCHLORIDE 10 MG/1
1 TABLET, FILM COATED ORAL
Qty: 0 | Refills: 0 | DISCHARGE

## 2021-08-24 RX ORDER — AMLODIPINE BESYLATE 2.5 MG/1
1 TABLET ORAL
Qty: 0 | Refills: 0 | DISCHARGE

## 2021-08-24 RX ORDER — LOSARTAN POTASSIUM 100 MG/1
1 TABLET, FILM COATED ORAL
Qty: 0 | Refills: 0 | DISCHARGE

## 2021-08-24 NOTE — PATIENT PROFILE ADULT - MONEY FOR FOOD
ADDICTION MEDICINE INITIAL ASSESSMENT    Patient: Cale Syed Consultant: LUIS Estrada   : 2000 Age: 20 year old   Date: 2021 Time: 9:18 AM     Chief Complaint: BZO/Sedative use, Stimulant use and Marijuana use     History of Present Illness:     Cale established with Dr. Arriaga on 21 for methamphetamine and alcohol dependence.  He was started on mirtazapine and referred to IOP.   Was recently admitted to the hospital for overdose of benzodiazepines on 21 after he was found in his [place] a [place]. Has only taken the mirtazapine a few times.  Can't recall what the treatment plan was.  Has no recollection of needing to get an HMO for IOP and has made no efforts in signing up for IOP assessment.  Cale admits that he used methamphetamine and marijuana just prior to today's appointment and appears under the influence.     The following history was reviewed from Dr. Arriaga's intake note:      Substance use pattern:    · Opiates - First time opioid use at age 20. Last use was codiene 21.  · Alcohol - Started drinking at age 18. Last drink was .  · Sedative/Hypnotics - started using age 19. Last use was 21.  · Stimulant/Cocaine - started using at age 20- methamphetamine, last use 21.  · Marijuana - started age 15, last use 21.  · Tobacco - none recently    Adverse effects of substance use in life:    · Physical - Overdose  · History of seizures and DTs: Yes   · Tolerance - Yes   · Withdrawal - Yes   · Mental - anxiety and ADHD  · Personal relationships - reported problems with spouse/partner, mother, step dad  · Social relationships - reported reduced interaction with sober friends. Also reported giving up hobbies for substance use.  · Job/Financial -  was independent for financial support.  · Legal - overdose issues   · Time - Spent a lot of time getting, using, or recovering related to substance use  · Cravings - Yes, frequently  · Loss of  control - Yes, over amount, duration and location of use    Recovery attempts:    · Attempts to cut down/stop - Multiple times  · Longest lifetime sobriety - 2 weeks  · Longest sobriety in last 12 months - 2 weeks  · 12 step group participation - Current participation No, Past participation Yes   · IOP/PHP - none  · Detox/Rehab - [place]  · Medications - Patient has tried None in past.    Past Medical History:     Past Medical History:   Diagnosis Date   • ADHD     Reported per external records   • Alcohol abuse    • Anxiety    • Depression    • Polysubstance abuse (CMS/HCC)        Past Surgical History:   Procedure Laterality Date   • No past surgeries         Past Psychiatric History:   anxiety and ADHD.   Suicide: suicidal attempts at age 18 (overdose), currently denies any SI or HI, access to firearms No    Family History:  Mother: Alcohol use disorder No Drug use disorder No  Father:Alcohol use disorder Yes Drug use disorder No  Siblings:Alcohol use disorder No Drug use disorder No  Extended Family: Alcohol use disorder Yes Drug use disorder Yes    Social/Legal History:   Housing - Homeless  Job/Finances - Unemployed, Financially dependent on parents  Highest education - Other 11th grade    ALLERGIES:  ALLERGIES:   Allergen Reactions   • Ibuprofen Other (See Comments)     History of acute kidney injury       Current Medications:  Current Outpatient Medications   Medication Sig Dispense Refill   • mirtazapine (REMERON) 30 MG tablet Take 1 tablet by mouth nightly. 30 tablet 0     No current facility-administered medications for this visit.        ePDMP was reviewed, found to be consistent with history, no abberations      Physical Exam:  Visit Vitals  /78   Pulse 90   Resp 16   Ht 5' 9\" (1.753 m)   Wt 70.8 kg   BMI 23.05 kg/m²       General Appearance: AAOx3, cooperative, no distress.  Slow moving, eye lids low, head hung forward, slurring words at times, repeating same questions frequently.  HEENT:  NC/AT. Hearing normal to conversational speech.   Lungs: respirations unlabored, no use of accessory muscles.  Psychiatric: AAOx3, mood and affect are ambivalent    Investigations:  All pertinent labs and imaging were reviewed.    POC Urine Drug Screen Results: amphetamines, THC, methamphetamine and MDMA    Assessment and Plan:    Methamphetamine dependence (CMS/HCC)  (primary encounter diagnosis)  Drug dependence (CMS/HCC)  Stimulant use disorder  Marijuana use    Discussed plan of care with Dr. Arriaga  Discussed with patient that he requires a higher level of care- residential or inpatient.   Cale states \"but there's nothing wrong in my life.\"  I told him it was his choice whether or not pursue treatment but I strongly recommend it.  Cale states \"but I have to go to senior living.\"  I recommended patient take care of his legal issues and seek treatment.   Referred to [facility] for AODA assessment and help with finding treatment- contact information and hours provided.    Patient was counseled about sobriety and encouraged to participate in community based self-help groups. Patient was also informed about benefits, risks and alternatives regarding prescription medications.   Discussed reminder with patient that all appointments, including pill counts and drugs are being scheduled at this time.  Walk-in visits are not being conducted at this time.  We let the patient know that if they are struggling to make a visit, please let us know a we will help to find the best option for them.    Follow Up: with [facility]    Visit start time: 9:10  Visit end time: 9:40    I spent a total of 30 minutes on the day of the visit.  This includes pre-charting, chart review, documenting and referring/communicating with other health care professionals.    LUIS Estrada  [address] [place] [id number]   no

## 2021-08-25 ENCOUNTER — APPOINTMENT (OUTPATIENT)
Dept: THORACIC SURGERY | Facility: HOSPITAL | Age: 66
End: 2021-08-25

## 2021-08-25 ENCOUNTER — INPATIENT (INPATIENT)
Facility: HOSPITAL | Age: 66
LOS: 3 days | Discharge: HOME | DRG: 327 | End: 2021-08-29
Attending: SPECIALIST | Admitting: SPECIALIST
Payer: MEDICARE

## 2021-08-25 VITALS
OXYGEN SATURATION: 99 % | RESPIRATION RATE: 18 BRPM | HEIGHT: 63 IN | SYSTOLIC BLOOD PRESSURE: 112 MMHG | WEIGHT: 188.5 LBS | HEART RATE: 82 BPM | DIASTOLIC BLOOD PRESSURE: 76 MMHG | TEMPERATURE: 98 F

## 2021-08-25 DIAGNOSIS — Z98.890 OTHER SPECIFIED POSTPROCEDURAL STATES: Chronic | ICD-10-CM

## 2021-08-25 DIAGNOSIS — Z90.49 ACQUIRED ABSENCE OF OTHER SPECIFIED PARTS OF DIGESTIVE TRACT: Chronic | ICD-10-CM

## 2021-08-25 LAB
ANION GAP SERPL CALC-SCNC: 12 MMOL/L — SIGNIFICANT CHANGE UP (ref 5–17)
APTT BLD: 26.2 SEC — LOW (ref 27.5–35.5)
BLD GP AB SCN SERPL QL: NEGATIVE — SIGNIFICANT CHANGE UP
BUN SERPL-MCNC: 14 MG/DL — SIGNIFICANT CHANGE UP (ref 7–23)
CALCIUM SERPL-MCNC: 9.4 MG/DL — SIGNIFICANT CHANGE UP (ref 8.4–10.5)
CHLORIDE SERPL-SCNC: 104 MMOL/L — SIGNIFICANT CHANGE UP (ref 96–108)
CO2 SERPL-SCNC: 23 MMOL/L — SIGNIFICANT CHANGE UP (ref 22–31)
CREAT SERPL-MCNC: 0.64 MG/DL — SIGNIFICANT CHANGE UP (ref 0.5–1.3)
GLUCOSE SERPL-MCNC: 164 MG/DL — HIGH (ref 70–99)
HCT VFR BLD CALC: 37.1 % — SIGNIFICANT CHANGE UP (ref 34.5–45)
HGB BLD-MCNC: 12.4 G/DL — SIGNIFICANT CHANGE UP (ref 11.5–15.5)
INR BLD: 0.91 — SIGNIFICANT CHANGE UP (ref 0.88–1.16)
MAGNESIUM SERPL-MCNC: 2.8 MG/DL — HIGH (ref 1.6–2.6)
MCHC RBC-ENTMCNC: 32.7 PG — SIGNIFICANT CHANGE UP (ref 27–34)
MCHC RBC-ENTMCNC: 33.4 GM/DL — SIGNIFICANT CHANGE UP (ref 32–36)
MCV RBC AUTO: 97.9 FL — SIGNIFICANT CHANGE UP (ref 80–100)
NRBC # BLD: 0 /100 WBCS — SIGNIFICANT CHANGE UP (ref 0–0)
PLATELET # BLD AUTO: 207 K/UL — SIGNIFICANT CHANGE UP (ref 150–400)
POTASSIUM SERPL-MCNC: 4.2 MMOL/L — SIGNIFICANT CHANGE UP (ref 3.5–5.3)
POTASSIUM SERPL-SCNC: 4.2 MMOL/L — SIGNIFICANT CHANGE UP (ref 3.5–5.3)
PROTHROM AB SERPL-ACNC: 11 SEC — SIGNIFICANT CHANGE UP (ref 10.6–13.6)
RBC # BLD: 3.79 M/UL — LOW (ref 3.8–5.2)
RBC # FLD: 13.3 % — SIGNIFICANT CHANGE UP (ref 10.3–14.5)
RH IG SCN BLD-IMP: NEGATIVE — SIGNIFICANT CHANGE UP
SODIUM SERPL-SCNC: 139 MMOL/L — SIGNIFICANT CHANGE UP (ref 135–145)
WBC # BLD: 7.85 K/UL — SIGNIFICANT CHANGE UP (ref 3.8–10.5)
WBC # FLD AUTO: 7.85 K/UL — SIGNIFICANT CHANGE UP (ref 3.8–10.5)

## 2021-08-25 PROCEDURE — S2900 ROBOTIC SURGICAL SYSTEM: CPT | Mod: NC

## 2021-08-25 PROCEDURE — 43281 LAP PARAESOPHAG HERN REPAIR: CPT | Mod: 22

## 2021-08-25 PROCEDURE — 43281 LAP PARAESOPHAG HERN REPAIR: CPT | Mod: 80,22

## 2021-08-25 PROCEDURE — 71045 X-RAY EXAM CHEST 1 VIEW: CPT | Mod: 26

## 2021-08-25 PROCEDURE — 43235 EGD DIAGNOSTIC BRUSH WASH: CPT | Mod: 22

## 2021-08-25 RX ORDER — HYDROMORPHONE HYDROCHLORIDE 2 MG/ML
0.5 INJECTION INTRAMUSCULAR; INTRAVENOUS; SUBCUTANEOUS
Refills: 0 | Status: DISCONTINUED | OUTPATIENT
Start: 2021-08-25 | End: 2021-08-26

## 2021-08-25 RX ORDER — HEPARIN SODIUM 5000 [USP'U]/ML
5000 INJECTION INTRAVENOUS; SUBCUTANEOUS EVERY 8 HOURS
Refills: 0 | Status: DISCONTINUED | OUTPATIENT
Start: 2021-08-26 | End: 2021-08-29

## 2021-08-25 RX ORDER — LIDOCAINE 4 G/100G
1 CREAM TOPICAL DAILY
Refills: 0 | Status: DISCONTINUED | OUTPATIENT
Start: 2021-08-25 | End: 2021-08-29

## 2021-08-25 RX ORDER — SODIUM CHLORIDE 9 MG/ML
1000 INJECTION, SOLUTION INTRAVENOUS
Refills: 0 | Status: DISCONTINUED | OUTPATIENT
Start: 2021-08-25 | End: 2021-08-29

## 2021-08-25 RX ORDER — PANTOPRAZOLE SODIUM 20 MG/1
40 TABLET, DELAYED RELEASE ORAL DAILY
Refills: 0 | Status: DISCONTINUED | OUTPATIENT
Start: 2021-08-25 | End: 2021-08-26

## 2021-08-25 RX ORDER — BUPIVACAINE 13.3 MG/ML
20 INJECTION, SUSPENSION, LIPOSOMAL INFILTRATION ONCE
Refills: 0 | Status: DISCONTINUED | OUTPATIENT
Start: 2021-08-25 | End: 2021-08-26

## 2021-08-25 RX ORDER — ZOLPIDEM TARTRATE 10 MG/1
1 TABLET ORAL
Qty: 0 | Refills: 0 | DISCHARGE

## 2021-08-25 RX ORDER — CEFOTETAN DISODIUM 1 G
2 VIAL (EA) INJECTION EVERY 12 HOURS
Refills: 0 | Status: COMPLETED | OUTPATIENT
Start: 2021-08-25 | End: 2021-08-27

## 2021-08-25 RX ORDER — ACETAMINOPHEN 500 MG
1000 TABLET ORAL ONCE
Refills: 0 | Status: COMPLETED | OUTPATIENT
Start: 2021-08-26 | End: 2021-08-26

## 2021-08-25 RX ADMIN — HYDROMORPHONE HYDROCHLORIDE 0.5 MILLIGRAM(S): 2 INJECTION INTRAMUSCULAR; INTRAVENOUS; SUBCUTANEOUS at 20:55

## 2021-08-25 RX ADMIN — LIDOCAINE 1 PATCH: 4 CREAM TOPICAL at 20:38

## 2021-08-25 RX ADMIN — HYDROMORPHONE HYDROCHLORIDE 0.5 MILLIGRAM(S): 2 INJECTION INTRAMUSCULAR; INTRAVENOUS; SUBCUTANEOUS at 20:30

## 2021-08-25 NOTE — PROGRESS NOTE ADULT - SUBJECTIVE AND OBJECTIVE BOX
Operation / Date: Reop laparoscopic robotic assisted hiatal hernia repair, partial fundoplication, gastropexy ; 8/25/2021    SUBJECTIVE ASSESSMENT:  66y Female seen in the PACU after surgery. Hemodynamically stable, in pain. Pigtail in place to suction       Vital Signs Last 24 Hrs  T(C): 36.4 (25 Aug 2021 10:10), Max: 36.4 (25 Aug 2021 10:10)  T(F): 97.6 (25 Aug 2021 10:10), Max: 97.6 (25 Aug 2021 10:10)  HR: 71 (25 Aug 2021 21:30) (67 - 82)  BP: 130/77 (25 Aug 2021 21:30) (112/76 - 146/85)  BP(mean): 96 (25 Aug 2021 21:30) (96 - 108)  RR: 20 (25 Aug 2021 21:30) (13 - 20)  SpO2: 99% (25 Aug 2021 21:30) (98% - 99%)  I&O's Detail    25 Aug 2021 07:01  -  25 Aug 2021 21:57  --------------------------------------------------------  IN:    Lactated Ringers: 150 mL  Total IN: 150 mL    OUT:    Chest Tube (mL): 8 mL  Total OUT: 8 mL    Total NET: 142 mL      PIGTAIL: Yes, to suction   ANTHONY DRAIN:  No  EPICARDIAL WIRES: No  TIE DOWNS: No  POSADAS: No    PHYSICAL EXAM:    Gen: NAD, laying bed, non-toxic appearing   Neuro: AOx3, following commands   Cardiac: RRR, S1S2, no murmur noted   Resp: CTAB, no wheeze noted. Left pigtail in place   Abd: Soft, NT, ND, +BS. Laparoscopic incisions c/d/i  Ext: WWP, no LE edema noted b/l   Vascular: Pulses present and equal throughout   Incisions: Laparoscopic incision c/d/i     LABS:                        12.4   7.85  )-----------( 207      ( 25 Aug 2021 20:06 )             37.1       COUMADIN:  No    PT/INR - ( 25 Aug 2021 20:06 )   PT: 11.0 sec;   INR: 0.91          PTT - ( 25 Aug 2021 20:06 )  PTT:26.2 sec    08-25    139  |  104  |  14  ----------------------------<  164<H>  4.2   |  23  |  0.64    Ca    9.4      25 Aug 2021 20:06  Mg     2.8     08-25        MEDICATIONS  (STANDING):  BUpivacaine liposome 1.3% Injectable (no eMAR) 20 milliLiter(s) Local Injection once  cefoTEtan  IVPB 2 Gram(s) IV Intermittent every 12 hours  lactated ringers. 1000 milliLiter(s) (75 mL/Hr) IV Continuous <Continuous>  lidocaine   4% Patch 1 Patch Transdermal daily  pantoprazole  Injectable 40 milliGRAM(s) IV Push daily    MEDICATIONS  (PRN):  HYDROmorphone  Injectable 0.5 milliGRAM(s) IV Push every 1 hour PRN Severe Pain (7 - 10)        RADIOLOGY & ADDITIONAL TESTS:

## 2021-08-25 NOTE — H&P ADULT - REASON FOR ADMISSION
Elective: EGD, laparoscopic robotic assisted redo hiatal hernia repair, fundoplication, possible gastropexy, possible mesh

## 2021-08-25 NOTE — PROGRESS NOTE ADULT - ASSESSMENT
66F former smoker with a pmhx of HTN, TIA, COVID (1/21), IBS, migraines, chronic neck and back pain, cholecystectomy, and hiatal hernia repair (2010) who presented in the out patient setting with recurrent hiatal hernia. After workup she was deemed a good candidate for reop hiatal hernia repair. On 8/25 she underwent reop hiatal hernia repair. Intraoperative course was uncomplicated and she was transferred directly to the PACU. She is currently recovering well in PACU.     Plan:   ==== Neurovascular ====  -No delirium, pain well managed on current regimen  -C/w PRNs for Pain control  -Monitor neuro status  -Insomnia: on zolpidem at home     ==== Respiratory ====  -Postop CXR stable, f/u in AM   -Left pigtail placed in OR, keep on suction tonight, likely d/c tomorrow   -Encourage IS 10x/hour while awake, Cough and deep breathing exercises  -Monitor respiratory status via SpO2  -Continue to wean NC as tolerated    ==== Cardiovascular ====  HTN  -On Losartan, propranolol at home, restart when able     ==== GI ====   -NPO for now   -Prophylaxis: Protonix  -C/w bowel regimen  -IBS: On linzess at home     ==== /Renal ====  -BUN/Cr: 14/0.64  -Trend Cr on AM labs  -Replete electrolytes as needed    ==== ID ====   Afebrile, asymptomatic  -WBC: 7.85  -Continue to monitor for SIRS/Sepsis syndrome while inpatient    ==== Endocrine ====   -No h/o DM  -No h/o thyroid disease     ==== Hematologic ====   -H/H: 12.4/37.1  -CBC, chem in AM  -DVT ppx: HSQ 5000u q8h and SCDs    ==== Disposition Planning ====  Home when medically appropriate.

## 2021-08-25 NOTE — H&P ADULT - HISTORY OF PRESENT ILLNESS
66 year old female, former smoker, quit in January after developing Covid, with a PMHx of HTN, TIA 7 yrs ago, COVID 1/21, IBS, migraines, chronic neck and back pain, cholecystectomy, and hiatal hernia repair (2010), now with recurrent hernia.     CT chest/abdomen completed on 06/04/21:  -small hiatal hernia  -mild intrahepatic bile duct dilation on the basis of cholecystomy  -tiny umbilical hernia     EGD completed on 06/07/21:  -large recurrence of her paraesophageal hernia in a type 2 fashion with a large paraesophageal hernia in a type 2 fashion with a large paraesophageal component with some extrinsic compression on the esophagus     gastric emptying study completed on 7/16/21:  -Normal gastric emptying study   66 year old female, former smoker, quit in January after developing Covid, with a PMHx of HTN, TIA 7 yrs ago, COVID 1/21, IBS, migraines, chronic neck and back pain, cholecystectomy, and hiatal hernia repair (2010), now with recurrent hernia.     CT chest/abdomen completed on 06/04/21:  -small hiatal hernia  -mild intrahepatic bile duct dilation on the basis of cholecystomy  -tiny umbilical hernia     EGD completed on 06/07/21:  -large recurrence of her paraesophageal hernia in a type 2 fashion with a large paraesophageal hernia in a type 2 fashion with a large paraesophageal component with some extrinsic compression on the esophagus     gastric emptying study completed on 7/16/21:  -Normal gastric emptying study    Patient seen in same day holding area; Reports no changes to PMHx or medications since last seen by our team. Denies acute or current SOB, chest pain, palpitation, N/V/D, fever/chills, recent illness, or any other concerning symptoms.  She endorses taking her losartan this AM.

## 2021-08-25 NOTE — BRIEF OPERATIVE NOTE - COMMENTS
Dr. Shen was the first assistant for this case including but not limited to positioning, port placement, hiatal hernia repair, closure.     I was present for this procedure and participated as first assistant as described by the PA above, unless otherwise noted below.

## 2021-08-25 NOTE — BRIEF OPERATIVE NOTE - NSICDXBRIEFPROCEDURE_GEN_ALL_CORE_FT
PROCEDURES:  Laparoscopic repair of hiatal hernia without using mesh 25-Aug-2021 19:53:22 Laparoscopic, robotic assisted re-do Hiatal Hernia Repair, gastropexy Clifford Day

## 2021-08-25 NOTE — H&P ADULT - NSHPLABSRESULTS_GEN_ALL_CORE
CT chest/abdomen completed on 06/04/21:  -small hiatal hernia  -mild intrahepatic bile duct dilation on the basis of cholecystomy  -tiny umbilical hernia     EGD completed on 06/07/21:  -large recurrence of her paraesophageal hernia in a type 2 fashion with a large paraesophageal hernia in a type 2 fashion with a large paraesophageal component with some extrinsic compression on the esophagus     Gastric emptying study completed on 7/16/21:  -Normal gastric emptying study

## 2021-08-25 NOTE — H&P ADULT - ASSESSMENT
66 year old female, former smoker (quit 1/2021), with a PMHx of HTN, TIA 7yrs ago, COVID 1/21, IBS, migraines, chronic neck and back pain, cholecystectomy, and hiatal hernia repair (2010), now with a recurrent hernia. Patient presents today for an elective: EGD, laparoscopic robotic assisted redo hiatal hernia repair, fundoplication, possible gastropexy, possible mesh.    66 year old female, former smoker (quit 1/2021), with a PMHx of HTN, TIA 7yrs ago, COVID 1/21, IBS, migraines, chronic neck and back pain, cholecystectomy, and hiatal hernia repair (2010), now with a recurrent hernia. Patient presents today for an elective: EGD, laparoscopic robotic assisted redo hiatal hernia repair, fundoplication, possible gastropexy, possible mesh.     Admit to OR  Consent in Chart  Blood available  To PACU then [facility]La

## 2021-08-25 NOTE — PRE-OP CHECKLIST - SELECT TESTS ORDERED
ct brain, stress test , UGI/BMP/CBC/PT/PTT/INR/Urinalysis/EKG Topical Sulfur Applications Counseling: Topical Sulfur Counseling: Patient counseled that this medication may cause skin irritation or allergic reactions.  In the event of skin irritation, the patient was advised to reduce the amount of the drug applied or use it less frequently.   The patient verbalized understanding of the proper use and possible adverse effects of topical sulfur application.  All of the patient's questions and concerns were addressed. ct brain, stress test , UGI/BMP/CBC/PT/PTT/INR/Urinalysis/EKG/COVID-19

## 2021-08-26 LAB
A1C WITH ESTIMATED AVERAGE GLUCOSE RESULT: 5 % — SIGNIFICANT CHANGE UP (ref 4–5.6)
ANION GAP SERPL CALC-SCNC: 10 MMOL/L — SIGNIFICANT CHANGE UP (ref 5–17)
APTT BLD: 25.5 SEC — LOW (ref 27.5–35.5)
BUN SERPL-MCNC: 15 MG/DL — SIGNIFICANT CHANGE UP (ref 7–23)
CALCIUM SERPL-MCNC: 9.4 MG/DL — SIGNIFICANT CHANGE UP (ref 8.4–10.5)
CHLORIDE SERPL-SCNC: 102 MMOL/L — SIGNIFICANT CHANGE UP (ref 96–108)
CO2 SERPL-SCNC: 23 MMOL/L — SIGNIFICANT CHANGE UP (ref 22–31)
CREAT SERPL-MCNC: 0.65 MG/DL — SIGNIFICANT CHANGE UP (ref 0.5–1.3)
ESTIMATED AVERAGE GLUCOSE: 97 MG/DL — SIGNIFICANT CHANGE UP (ref 68–114)
GLUCOSE SERPL-MCNC: 166 MG/DL — HIGH (ref 70–99)
HCT VFR BLD CALC: 36.7 % — SIGNIFICANT CHANGE UP (ref 34.5–45)
HGB BLD-MCNC: 12.4 G/DL — SIGNIFICANT CHANGE UP (ref 11.5–15.5)
MAGNESIUM SERPL-MCNC: 2.3 MG/DL — SIGNIFICANT CHANGE UP (ref 1.6–2.6)
MCHC RBC-ENTMCNC: 32.6 PG — SIGNIFICANT CHANGE UP (ref 27–34)
MCHC RBC-ENTMCNC: 33.8 GM/DL — SIGNIFICANT CHANGE UP (ref 32–36)
MCV RBC AUTO: 96.6 FL — SIGNIFICANT CHANGE UP (ref 80–100)
NRBC # BLD: 0 /100 WBCS — SIGNIFICANT CHANGE UP (ref 0–0)
PA ADP PRP-ACNC: 246 PRU — SIGNIFICANT CHANGE UP (ref 194–418)
PLATELET # BLD AUTO: 214 K/UL — SIGNIFICANT CHANGE UP (ref 150–400)
POTASSIUM SERPL-MCNC: 5.2 MMOL/L — SIGNIFICANT CHANGE UP (ref 3.5–5.3)
POTASSIUM SERPL-SCNC: 5.2 MMOL/L — SIGNIFICANT CHANGE UP (ref 3.5–5.3)
RBC # BLD: 3.8 M/UL — SIGNIFICANT CHANGE UP (ref 3.8–5.2)
RBC # FLD: 13.2 % — SIGNIFICANT CHANGE UP (ref 10.3–14.5)
SODIUM SERPL-SCNC: 135 MMOL/L — SIGNIFICANT CHANGE UP (ref 135–145)
TSH SERPL-MCNC: 0.76 UIU/ML — SIGNIFICANT CHANGE UP (ref 0.27–4.2)
WBC # BLD: 7.08 K/UL — SIGNIFICANT CHANGE UP (ref 3.8–10.5)
WBC # FLD AUTO: 7.08 K/UL — SIGNIFICANT CHANGE UP (ref 3.8–10.5)

## 2021-08-26 PROCEDURE — 71045 X-RAY EXAM CHEST 1 VIEW: CPT | Mod: 26

## 2021-08-26 PROCEDURE — 71045 X-RAY EXAM CHEST 1 VIEW: CPT | Mod: 26,77

## 2021-08-26 RX ORDER — HYDROMORPHONE HYDROCHLORIDE 2 MG/ML
0.5 INJECTION INTRAMUSCULAR; INTRAVENOUS; SUBCUTANEOUS ONCE
Refills: 0 | Status: DISCONTINUED | OUTPATIENT
Start: 2021-08-26 | End: 2021-08-26

## 2021-08-26 RX ORDER — LOSARTAN POTASSIUM 100 MG/1
25 TABLET, FILM COATED ORAL DAILY
Refills: 0 | Status: DISCONTINUED | OUTPATIENT
Start: 2021-08-26 | End: 2021-08-27

## 2021-08-26 RX ORDER — SODIUM CHLORIDE 9 MG/ML
500 INJECTION INTRAMUSCULAR; INTRAVENOUS; SUBCUTANEOUS ONCE
Refills: 0 | Status: COMPLETED | OUTPATIENT
Start: 2021-08-26 | End: 2021-08-26

## 2021-08-26 RX ORDER — ONDANSETRON 8 MG/1
4 TABLET, FILM COATED ORAL EVERY 6 HOURS
Refills: 0 | Status: DISCONTINUED | OUTPATIENT
Start: 2021-08-26 | End: 2021-08-29

## 2021-08-26 RX ORDER — CLONAZEPAM 1 MG
1 TABLET ORAL AT BEDTIME
Refills: 0 | Status: DISCONTINUED | OUTPATIENT
Start: 2021-08-26 | End: 2021-08-29

## 2021-08-26 RX ORDER — PANTOPRAZOLE SODIUM 20 MG/1
40 TABLET, DELAYED RELEASE ORAL
Refills: 0 | Status: DISCONTINUED | OUTPATIENT
Start: 2021-08-26 | End: 2021-08-29

## 2021-08-26 RX ORDER — SENNA PLUS 8.6 MG/1
2 TABLET ORAL AT BEDTIME
Refills: 0 | Status: DISCONTINUED | OUTPATIENT
Start: 2021-08-26 | End: 2021-08-29

## 2021-08-26 RX ORDER — HYDROMORPHONE HYDROCHLORIDE 2 MG/ML
2 INJECTION INTRAMUSCULAR; INTRAVENOUS; SUBCUTANEOUS EVERY 6 HOURS
Refills: 0 | Status: DISCONTINUED | OUTPATIENT
Start: 2021-08-26 | End: 2021-08-29

## 2021-08-26 RX ORDER — ACETAMINOPHEN 500 MG
1000 TABLET ORAL ONCE
Refills: 0 | Status: COMPLETED | OUTPATIENT
Start: 2021-08-26 | End: 2021-08-26

## 2021-08-26 RX ORDER — METOCLOPRAMIDE HCL 10 MG
10 TABLET ORAL DAILY
Refills: 0 | Status: DISCONTINUED | OUTPATIENT
Start: 2021-08-26 | End: 2021-08-29

## 2021-08-26 RX ORDER — KETOROLAC TROMETHAMINE 30 MG/ML
15 SYRINGE (ML) INJECTION ONCE
Refills: 0 | Status: DISCONTINUED | OUTPATIENT
Start: 2021-08-26 | End: 2021-08-26

## 2021-08-26 RX ORDER — CITALOPRAM 10 MG/1
20 TABLET, FILM COATED ORAL DAILY
Refills: 0 | Status: DISCONTINUED | OUTPATIENT
Start: 2021-08-26 | End: 2021-08-29

## 2021-08-26 RX ORDER — ACETAMINOPHEN 500 MG
650 TABLET ORAL EVERY 6 HOURS
Refills: 0 | Status: DISCONTINUED | OUTPATIENT
Start: 2021-08-26 | End: 2021-08-29

## 2021-08-26 RX ORDER — ZOLPIDEM TARTRATE 10 MG/1
5 TABLET ORAL AT BEDTIME
Refills: 0 | Status: DISCONTINUED | OUTPATIENT
Start: 2021-08-26 | End: 2021-08-29

## 2021-08-26 RX ORDER — POLYETHYLENE GLYCOL 3350 17 G/17G
17 POWDER, FOR SOLUTION ORAL DAILY
Refills: 0 | Status: DISCONTINUED | OUTPATIENT
Start: 2021-08-26 | End: 2021-08-29

## 2021-08-26 RX ORDER — SUMATRIPTAN SUCCINATE 4 MG/.5ML
100 INJECTION, SOLUTION SUBCUTANEOUS ONCE
Refills: 0 | Status: COMPLETED | OUTPATIENT
Start: 2021-08-26 | End: 2021-08-26

## 2021-08-26 RX ADMIN — ONDANSETRON 4 MILLIGRAM(S): 8 TABLET, FILM COATED ORAL at 18:05

## 2021-08-26 RX ADMIN — HEPARIN SODIUM 5000 UNIT(S): 5000 INJECTION INTRAVENOUS; SUBCUTANEOUS at 23:35

## 2021-08-26 RX ADMIN — HYDROMORPHONE HYDROCHLORIDE 2 MILLIGRAM(S): 2 INJECTION INTRAMUSCULAR; INTRAVENOUS; SUBCUTANEOUS at 20:01

## 2021-08-26 RX ADMIN — HYDROMORPHONE HYDROCHLORIDE 2 MILLIGRAM(S): 2 INJECTION INTRAMUSCULAR; INTRAVENOUS; SUBCUTANEOUS at 14:38

## 2021-08-26 RX ADMIN — HYDROMORPHONE HYDROCHLORIDE 0.5 MILLIGRAM(S): 2 INJECTION INTRAMUSCULAR; INTRAVENOUS; SUBCUTANEOUS at 06:40

## 2021-08-26 RX ADMIN — CITALOPRAM 20 MILLIGRAM(S): 10 TABLET, FILM COATED ORAL at 14:09

## 2021-08-26 RX ADMIN — LIDOCAINE 1 PATCH: 4 CREAM TOPICAL at 12:12

## 2021-08-26 RX ADMIN — HYDROMORPHONE HYDROCHLORIDE 0.5 MILLIGRAM(S): 2 INJECTION INTRAMUSCULAR; INTRAVENOUS; SUBCUTANEOUS at 07:00

## 2021-08-26 RX ADMIN — HYDROMORPHONE HYDROCHLORIDE 0.5 MILLIGRAM(S): 2 INJECTION INTRAMUSCULAR; INTRAVENOUS; SUBCUTANEOUS at 12:14

## 2021-08-26 RX ADMIN — Medication 400 MILLIGRAM(S): at 14:20

## 2021-08-26 RX ADMIN — Medication 400 MILLIGRAM(S): at 08:00

## 2021-08-26 RX ADMIN — HYDROMORPHONE HYDROCHLORIDE 0.5 MILLIGRAM(S): 2 INJECTION INTRAMUSCULAR; INTRAVENOUS; SUBCUTANEOUS at 10:24

## 2021-08-26 RX ADMIN — ZOLPIDEM TARTRATE 5 MILLIGRAM(S): 10 TABLET ORAL at 23:35

## 2021-08-26 RX ADMIN — LOSARTAN POTASSIUM 25 MILLIGRAM(S): 100 TABLET, FILM COATED ORAL at 12:14

## 2021-08-26 RX ADMIN — HEPARIN SODIUM 5000 UNIT(S): 5000 INJECTION INTRAVENOUS; SUBCUTANEOUS at 14:19

## 2021-08-26 RX ADMIN — Medication 400 MILLIGRAM(S): at 01:18

## 2021-08-26 RX ADMIN — Medication 1000 MILLIGRAM(S): at 15:00

## 2021-08-26 RX ADMIN — LIDOCAINE 1 PATCH: 4 CREAM TOPICAL at 20:57

## 2021-08-26 RX ADMIN — HYDROMORPHONE HYDROCHLORIDE 0.5 MILLIGRAM(S): 2 INJECTION INTRAMUSCULAR; INTRAVENOUS; SUBCUTANEOUS at 01:12

## 2021-08-26 RX ADMIN — SODIUM CHLORIDE 75 MILLILITER(S): 9 INJECTION, SOLUTION INTRAVENOUS at 03:38

## 2021-08-26 RX ADMIN — Medication 1000 MILLIGRAM(S): at 01:40

## 2021-08-26 RX ADMIN — HYDROMORPHONE HYDROCHLORIDE 2 MILLIGRAM(S): 2 INJECTION INTRAMUSCULAR; INTRAVENOUS; SUBCUTANEOUS at 20:50

## 2021-08-26 RX ADMIN — Medication 15 MILLIGRAM(S): at 09:48

## 2021-08-26 RX ADMIN — HEPARIN SODIUM 5000 UNIT(S): 5000 INJECTION INTRAVENOUS; SUBCUTANEOUS at 06:43

## 2021-08-26 RX ADMIN — Medication 1000 MILLIGRAM(S): at 08:39

## 2021-08-26 RX ADMIN — SODIUM CHLORIDE 500 MILLILITER(S): 9 INJECTION INTRAMUSCULAR; INTRAVENOUS; SUBCUTANEOUS at 06:30

## 2021-08-26 RX ADMIN — SODIUM CHLORIDE 75 MILLILITER(S): 9 INJECTION, SOLUTION INTRAVENOUS at 09:00

## 2021-08-26 RX ADMIN — Medication 1 MILLIGRAM(S): at 23:34

## 2021-08-26 RX ADMIN — SENNA PLUS 2 TABLET(S): 8.6 TABLET ORAL at 23:34

## 2021-08-26 RX ADMIN — LIDOCAINE 1 PATCH: 4 CREAM TOPICAL at 12:14

## 2021-08-26 RX ADMIN — LIDOCAINE 1 PATCH: 4 CREAM TOPICAL at 06:43

## 2021-08-26 RX ADMIN — Medication 100 GRAM(S): at 06:53

## 2021-08-26 RX ADMIN — ONDANSETRON 4 MILLIGRAM(S): 8 TABLET, FILM COATED ORAL at 09:57

## 2021-08-26 RX ADMIN — PANTOPRAZOLE SODIUM 40 MILLIGRAM(S): 20 TABLET, DELAYED RELEASE ORAL at 14:23

## 2021-08-26 RX ADMIN — Medication 100 GRAM(S): at 14:21

## 2021-08-26 RX ADMIN — HYDROMORPHONE HYDROCHLORIDE 2 MILLIGRAM(S): 2 INJECTION INTRAMUSCULAR; INTRAVENOUS; SUBCUTANEOUS at 15:17

## 2021-08-26 RX ADMIN — Medication 15 MILLIGRAM(S): at 12:14

## 2021-08-26 NOTE — CHART NOTE - NSCHARTNOTEFT_GEN_A_CORE
Patient complaining of incisional pain not relieved with IV tylenol and IV toradol. States the only pain medication that works is IV dilaudid. Patient assessed multiple times, sleeping comfortably, hemodynamically stable with HR 60-70s, normotensive and breathing comfortably on 2L NC. Patient refusing to take tylenol or toradol for pain control, requesting dilaudid only. Discussed with Dr. Baker, despite paitent remaining hemodynamically stable, persistently requesting IV pain medication. Will start PO Dilaudid PRN for pain and continue to monitor.

## 2021-08-26 NOTE — PROVIDER CONTACT NOTE (OTHER) - ACTION/TREATMENT ORDERED:
PA said to wait 2 hours, unless patient is in pain or uncomfortable. if still not able to void will call the PA again and request a franco order

## 2021-08-26 NOTE — CHART NOTE - NSCHARTNOTEFT_GEN_A_CORE
As per Dr. Baker, left pleural pigtail removed at bedside without incident. Occlusive dressing applied. Patient tolerated procedure well. Follow up CXR stable with no obvious ptx

## 2021-08-26 NOTE — PROGRESS NOTE ADULT - SUBJECTIVE AND OBJECTIVE BOX
Patient discussed on morning rounds with Dr. Baker      Operation / Date: 8/25/2021 Laparoscopic, robotic assisted, redo hiatal hernia repair     SUBJECTIVE ASSESSMENT:  Patient seen this morning at bedside, complaining of incisional pain not relieved with IV Tylenol and Toradol Requesting Dilaudid only. Otherwise denies any shortness of breath or chest pain. States she had nausea this morning that resolved with Zofran.     Vital Signs Last 24 Hrs  T(C): 37 (25 Aug 2021 23:00), Max: 37 (25 Aug 2021 23:00)  T(F): 98.6 (25 Aug 2021 23:00), Max: 98.6 (25 Aug 2021 23:00)  HR: 67 (26 Aug 2021 09:54) (67 - 82)  BP: 143/77 (26 Aug 2021 09:54) (129/75 - 152/92)  BP(mean): 104 (26 Aug 2021 09:54) (95 - 116)  RR: 13 (26 Aug 2021 09:54) (6 - 20)  SpO2: 99% (26 Aug 2021 09:54) (96% - 99%)  I&O's Detail    25 Aug 2021 07:01  -  26 Aug 2021 07:00  --------------------------------------------------------  IN:    Lactated Ringers: 675 mL    Sodium Chloride 0.9% Bolus: 500 mL  Total IN: 1175 mL    OUT:    Chest Tube (mL): 8 mL  Total OUT: 8 mL    Total NET: 1167 mL      26 Aug 2021 07:01  -  26 Aug 2021 14:09  --------------------------------------------------------  IN:    Lactated Ringers: 150 mL  Total IN: 150 mL    OUT:    Chest Tube (mL): 10 mL    Voided (mL): 1100 mL  Total OUT: 1110 mL    Total NET: -960 mL    CHEST TUBE:  No  ANTHONY DRAIN: No  EPICARDIAL WIRES: No  TIE DOWNS: No  POSADAS: No    PHYSICAL EXAM:    General: Patient lying comfortably in bed, no acute distress     Neurological: Alert and oriented. No focal neurological deficits     Cardiovascular: S1S2, RRR, no murmurs appreciated on exam     Respiratory: Clear to ausculation bilaterally     Gastrointestinal: Abdomen soft, non tender, non distended     Extremities: Warm and well perfused. No edema or calf tenderness     Vascular: Peripheral pulses 2+ bilaterally     Incision Sites: Abdominal incisions C/D/I   Left pigtail site covered with occlusive dressing     LABS:                        12.4   7.08  )-----------( 214      ( 26 Aug 2021 05:04 )             36.7       COUMADIN:  No    PT/INR - ( 25 Aug 2021 20:06 )   PT: 11.0 sec;   INR: 0.91          PTT - ( 25 Aug 2021 20:06 )  PTT:26.2 sec    08-26    135  |  102  |  15  ----------------------------<  166<H>  5.2   |  23  |  0.65    Ca    9.4      26 Aug 2021 05:04  Mg     2.3     08-26    MEDICATIONS  (STANDING):  acetaminophen  IVPB .. 1000 milliGRAM(s) IV Intermittent once  cefoTEtan  IVPB 2 Gram(s) IV Intermittent every 12 hours  citalopram 20 milliGRAM(s) Oral daily  clonazePAM  Tablet 1 milliGRAM(s) Oral at bedtime  heparin   Injectable 5000 Unit(s) SubCutaneous every 8 hours  lactated ringers. 1000 milliLiter(s) (75 mL/Hr) IV Continuous <Continuous>  lidocaine   4% Patch 1 Patch Transdermal daily  losartan 25 milliGRAM(s) Oral daily  ondansetron Injectable 4 milliGRAM(s) IV Push every 6 hours  pantoprazole    Tablet 40 milliGRAM(s) Oral before breakfast  polyethylene glycol 3350 17 Gram(s) Oral daily  senna 2 Tablet(s) Oral at bedtime    MEDICATIONS  (PRN):  HYDROmorphone   Tablet 2 milliGRAM(s) Oral every 6 hours PRN Moderate Pain (4 - 6)        RADIOLOGY & ADDITIONAL TESTS:

## 2021-08-26 NOTE — PROGRESS NOTE ADULT - ASSESSMENT
A/P: 66 year old female, with PMHx of HTN, TIA 7 years ago, COVID 1/2021, IBS, Migraines, Chronic neck and back pain, s/p hiatal hernia repair 2010 now with recurrent hernia with recent admission to [facility] in the beginning of August for acute abdominal pain and vomiting. Patient underwent EGD on 7/30 revealing gastritis and had to be aborted due to hypoxia and apnea. Her diet was advanced     66 year old female, former smoker, quit in January after developing Covid, with a PMHx of HTN, TIA 7 yrs ago, COVID 1/21, IBS, migraines, chronic neck and back pain, cholecystectomy, and hiatal hernia repair (2010), now with recurrent hernia.   She was referred to the ED and underwent CT abdomen with IV/Oral contrast which confirmed her recurrent hiatal hernia. On 7/30 she underwent EGD with GI which revealed gastritis but was aborted when patient became apneic and hypoxic. CT head obtained which revealed 11 mm right frontal cavity convexity calcified meningioma. Neurology consulted, per evaluation nausea, vomiting not likely caused by meningioma. Anesthesia also consulted for management of nausea and pepcid and decadron were added. She was advanced to a clear liquid and then a puree diet with improvement of symptoms. She is now stable on new regimen and tolerating puree diet. At this time she is now stable for discharge to home on puree diet with close follow up with the thoracic surgery team.     Neurovascular: No delirium. Pain well controlled with current regimen.  -PRN's.    Cardiovascular: Hemodynamically stable. HR controlled.  -BP. HR. EKG. TTE. Cardiac Panel. Lipid Panel. BNP.   -ASA. Plavix. BBlocker. Statin.      Respiratory: 02 Sat = 98% on RA.  -If on oxygen wean to RA from for O2 Sat > 93%.  -Encourage C+DB and Use of IS 10x / hr while awake.  -CXR.    GI: Stable.  -NPO after MN.  -PPX.  -PO Diet.    Renal / :  -Monitor renal function.  -Monitor I/O's.    Endocrine:    -A1c.  -TSH.    Hematologic:  -CBC.  -Coagulation Panel.    ID:  -Tempature.  -CBC.  -Observe for SIRS/Sepsis Syndrome.    Prophylaxis:  -DVT prophylaxis with 5000 SubQ Heparin q8h.  -SCD's    Disposition:  -ICU for frequent monitoring.: A/P: 66 year old female, with PMHx of HTN, TIA 7 years ago, COVID 1/2021, IBS, Migraines, Chronic neck and back pain, s/p hiatal hernia repair 2010 now with recurrent hernia with recent admission to [facility] in the beginning of August for acute abdominal pain and vomiting. Patient underwent EGD on 7/30 revealing gastritis and had to be aborted due to hypoxia and apnea. She was started on Pepcid and decadron. Her diet was slowly advanced with improvement in her symptoms and she was discharged home on 8/4 with outpatient follow up. She now presented to [facility] to 8/25 for planned procedure and underwent Laparoscopic RA redo hiatal hernia repair. Procedure uncomplicated and doing well.     Neurovascular: Incisional pain uncontrolled per patient   - Start PO Dilaudid 2mg Q6   - Continue IV Toradol and Tylenol PRN   - Anxiety/Depression - continue citalopram 20mg, Klonopin 1mg     Cardiovascular: Hemodynamically stable. HR controlled.  - HTN continue losartan 25mg daily. Advance to home dose 50mg as tolerated.     Respiratory: 02 Sat = 98% on 2L NC   - Continue to wean supplemental O2 to maintain SaO2 > 93%   - Encourage C+DB and Use of IS 10x / hr while awake.  - CXR post chest tube removal stable with no obvious ptx, f/u in AM     GI: Stable.  - Clear liquid diet, if tolerated will advance to full liquids  this evening   - Continue standing zofran 4mg Q6   - Reglan PRN   - Continue protonix 40mg for GI ppx   - Continue bowel regimen     Renal / : BUN/Cr 15/0.65. No active issues   - Monitor renal function.  - Monitor I/O's.    Endocrine: hgba1c and TSH pending   - No active issues      Prophylaxis:  - DVT prophylaxis with 5000 SubQ Heparin q8h.  - SCD's    Disposition: Home when medically ready, likely tomorrow

## 2021-08-27 LAB
ALBUMIN SERPL ELPH-MCNC: 3.9 G/DL — SIGNIFICANT CHANGE UP (ref 3.3–5)
ALP SERPL-CCNC: 86 U/L — SIGNIFICANT CHANGE UP (ref 40–120)
ALT FLD-CCNC: 169 U/L — HIGH (ref 10–45)
ANION GAP SERPL CALC-SCNC: 8 MMOL/L — SIGNIFICANT CHANGE UP (ref 5–17)
APTT BLD: 24.1 SEC — LOW (ref 27.5–35.5)
AST SERPL-CCNC: 155 U/L — HIGH (ref 10–40)
BILIRUB SERPL-MCNC: 0.3 MG/DL — SIGNIFICANT CHANGE UP (ref 0.2–1.2)
BUN SERPL-MCNC: 14 MG/DL — SIGNIFICANT CHANGE UP (ref 7–23)
CALCIUM SERPL-MCNC: 9.3 MG/DL — SIGNIFICANT CHANGE UP (ref 8.4–10.5)
CHLORIDE SERPL-SCNC: 103 MMOL/L — SIGNIFICANT CHANGE UP (ref 96–108)
CO2 SERPL-SCNC: 28 MMOL/L — SIGNIFICANT CHANGE UP (ref 22–31)
CREAT SERPL-MCNC: 0.8 MG/DL — SIGNIFICANT CHANGE UP (ref 0.5–1.3)
GLUCOSE SERPL-MCNC: 101 MG/DL — HIGH (ref 70–99)
HCT VFR BLD CALC: 35.2 % — SIGNIFICANT CHANGE UP (ref 34.5–45)
HGB BLD-MCNC: 11.8 G/DL — SIGNIFICANT CHANGE UP (ref 11.5–15.5)
INR BLD: 0.92 — SIGNIFICANT CHANGE UP (ref 0.88–1.16)
MAGNESIUM SERPL-MCNC: 2.2 MG/DL — SIGNIFICANT CHANGE UP (ref 1.6–2.6)
MCHC RBC-ENTMCNC: 32.7 PG — SIGNIFICANT CHANGE UP (ref 27–34)
MCHC RBC-ENTMCNC: 33.5 GM/DL — SIGNIFICANT CHANGE UP (ref 32–36)
MCV RBC AUTO: 97.5 FL — SIGNIFICANT CHANGE UP (ref 80–100)
NRBC # BLD: 0 /100 WBCS — SIGNIFICANT CHANGE UP (ref 0–0)
PLATELET # BLD AUTO: 212 K/UL — SIGNIFICANT CHANGE UP (ref 150–400)
POTASSIUM SERPL-MCNC: 3.8 MMOL/L — SIGNIFICANT CHANGE UP (ref 3.5–5.3)
POTASSIUM SERPL-SCNC: 3.8 MMOL/L — SIGNIFICANT CHANGE UP (ref 3.5–5.3)
PROT SERPL-MCNC: 6 G/DL — SIGNIFICANT CHANGE UP (ref 6–8.3)
PROTHROM AB SERPL-ACNC: 11.1 SEC — SIGNIFICANT CHANGE UP (ref 10.6–13.6)
RBC # BLD: 3.61 M/UL — LOW (ref 3.8–5.2)
RBC # FLD: 13.2 % — SIGNIFICANT CHANGE UP (ref 10.3–14.5)
SODIUM SERPL-SCNC: 139 MMOL/L — SIGNIFICANT CHANGE UP (ref 135–145)
WBC # BLD: 6.45 K/UL — SIGNIFICANT CHANGE UP (ref 3.8–10.5)
WBC # FLD AUTO: 6.45 K/UL — SIGNIFICANT CHANGE UP (ref 3.8–10.5)

## 2021-08-27 PROCEDURE — 71045 X-RAY EXAM CHEST 1 VIEW: CPT | Mod: 26

## 2021-08-27 RX ORDER — LOSARTAN POTASSIUM 100 MG/1
25 TABLET, FILM COATED ORAL ONCE
Refills: 0 | Status: COMPLETED | OUTPATIENT
Start: 2021-08-27 | End: 2021-08-27

## 2021-08-27 RX ORDER — SUMATRIPTAN SUCCINATE 4 MG/.5ML
100 INJECTION, SOLUTION SUBCUTANEOUS ONCE
Refills: 0 | Status: COMPLETED | OUTPATIENT
Start: 2021-08-27 | End: 2021-08-27

## 2021-08-27 RX ORDER — LOSARTAN POTASSIUM 100 MG/1
50 TABLET, FILM COATED ORAL DAILY
Refills: 0 | Status: DISCONTINUED | OUTPATIENT
Start: 2021-08-28 | End: 2021-08-29

## 2021-08-27 RX ORDER — POTASSIUM CHLORIDE 20 MEQ
40 PACKET (EA) ORAL ONCE
Refills: 0 | Status: COMPLETED | OUTPATIENT
Start: 2021-08-27 | End: 2021-08-27

## 2021-08-27 RX ADMIN — CITALOPRAM 20 MILLIGRAM(S): 10 TABLET, FILM COATED ORAL at 11:49

## 2021-08-27 RX ADMIN — ZOLPIDEM TARTRATE 5 MILLIGRAM(S): 10 TABLET ORAL at 22:50

## 2021-08-27 RX ADMIN — HEPARIN SODIUM 5000 UNIT(S): 5000 INJECTION INTRAVENOUS; SUBCUTANEOUS at 22:26

## 2021-08-27 RX ADMIN — LIDOCAINE 1 PATCH: 4 CREAM TOPICAL at 11:58

## 2021-08-27 RX ADMIN — PANTOPRAZOLE SODIUM 40 MILLIGRAM(S): 20 TABLET, DELAYED RELEASE ORAL at 07:03

## 2021-08-27 RX ADMIN — ONDANSETRON 4 MILLIGRAM(S): 8 TABLET, FILM COATED ORAL at 22:27

## 2021-08-27 RX ADMIN — HYDROMORPHONE HYDROCHLORIDE 2 MILLIGRAM(S): 2 INJECTION INTRAMUSCULAR; INTRAVENOUS; SUBCUTANEOUS at 15:24

## 2021-08-27 RX ADMIN — ONDANSETRON 4 MILLIGRAM(S): 8 TABLET, FILM COATED ORAL at 17:54

## 2021-08-27 RX ADMIN — ONDANSETRON 4 MILLIGRAM(S): 8 TABLET, FILM COATED ORAL at 00:24

## 2021-08-27 RX ADMIN — Medication 1 MILLIGRAM(S): at 22:27

## 2021-08-27 RX ADMIN — ZOLPIDEM TARTRATE 5 MILLIGRAM(S): 10 TABLET ORAL at 00:30

## 2021-08-27 RX ADMIN — HYDROMORPHONE HYDROCHLORIDE 2 MILLIGRAM(S): 2 INJECTION INTRAMUSCULAR; INTRAVENOUS; SUBCUTANEOUS at 08:03

## 2021-08-27 RX ADMIN — SUMATRIPTAN SUCCINATE 100 MILLIGRAM(S): 4 INJECTION, SOLUTION SUBCUTANEOUS at 11:30

## 2021-08-27 RX ADMIN — POLYETHYLENE GLYCOL 3350 17 GRAM(S): 17 POWDER, FOR SOLUTION ORAL at 11:49

## 2021-08-27 RX ADMIN — SENNA PLUS 2 TABLET(S): 8.6 TABLET ORAL at 22:27

## 2021-08-27 RX ADMIN — ONDANSETRON 4 MILLIGRAM(S): 8 TABLET, FILM COATED ORAL at 11:49

## 2021-08-27 RX ADMIN — HYDROMORPHONE HYDROCHLORIDE 2 MILLIGRAM(S): 2 INJECTION INTRAMUSCULAR; INTRAVENOUS; SUBCUTANEOUS at 07:03

## 2021-08-27 RX ADMIN — HEPARIN SODIUM 5000 UNIT(S): 5000 INJECTION INTRAVENOUS; SUBCUTANEOUS at 14:28

## 2021-08-27 RX ADMIN — LOSARTAN POTASSIUM 25 MILLIGRAM(S): 100 TABLET, FILM COATED ORAL at 07:03

## 2021-08-27 RX ADMIN — Medication 40 MILLIEQUIVALENT(S): at 08:31

## 2021-08-27 RX ADMIN — SUMATRIPTAN SUCCINATE 100 MILLIGRAM(S): 4 INJECTION, SOLUTION SUBCUTANEOUS at 00:24

## 2021-08-27 RX ADMIN — LOSARTAN POTASSIUM 25 MILLIGRAM(S): 100 TABLET, FILM COATED ORAL at 08:31

## 2021-08-27 RX ADMIN — ONDANSETRON 4 MILLIGRAM(S): 8 TABLET, FILM COATED ORAL at 07:03

## 2021-08-27 RX ADMIN — LIDOCAINE 1 PATCH: 4 CREAM TOPICAL at 01:00

## 2021-08-27 RX ADMIN — HEPARIN SODIUM 5000 UNIT(S): 5000 INJECTION INTRAVENOUS; SUBCUTANEOUS at 07:04

## 2021-08-27 RX ADMIN — SUMATRIPTAN SUCCINATE 100 MILLIGRAM(S): 4 INJECTION, SOLUTION SUBCUTANEOUS at 10:24

## 2021-08-27 RX ADMIN — HYDROMORPHONE HYDROCHLORIDE 2 MILLIGRAM(S): 2 INJECTION INTRAMUSCULAR; INTRAVENOUS; SUBCUTANEOUS at 14:29

## 2021-08-27 RX ADMIN — Medication 100 GRAM(S): at 02:32

## 2021-08-27 RX ADMIN — LIDOCAINE 1 PATCH: 4 CREAM TOPICAL at 18:04

## 2021-08-27 RX ADMIN — SUMATRIPTAN SUCCINATE 100 MILLIGRAM(S): 4 INJECTION, SOLUTION SUBCUTANEOUS at 01:24

## 2021-08-27 RX ADMIN — HYDROMORPHONE HYDROCHLORIDE 2 MILLIGRAM(S): 2 INJECTION INTRAMUSCULAR; INTRAVENOUS; SUBCUTANEOUS at 22:27

## 2021-08-27 NOTE — DIETITIAN INITIAL EVALUATION ADULT. - OTHER INFO
66 year old female, with PMHx of HTN, TIA 7 years ago, COVID 1/2021, IBS, Migraines, Chronic neck and back pain, s/p hiatal hernia repair 2010 now with recurrent hernia with recent admission to [facility] in the beginning of August for acute abdominal pain and vomiting. Patient underwent EGD on 7/30 revealing gastritis and had to be aborted due to hypoxia and apnea. She was started on Pepcid and decadron. Her diet was slowly advanced with improvement in her symptoms and she was discharged home on 8/4 with outpatient follow up. She now presented to [facility] to 8/25 for planned procedure and underwent Laparoscopic RA redo hiatal hernia repair. Procedure uncomplicated and doing well. Patient tolerated clear liquid diet on POD#1 advanced to full liquid diet today on POD#2. Pt c/o incisional pain and needing soft diet as her front teeth are loose. Denies n/v/d/c, pain being managed, still on CLD at time of assessment this morning with fair intake noted. Skin with incision to abdomen, no pressure ulcers or edema. Discussed likely stages of diet advancement throughout admission and while at home. NKFA or recent wt changes noted. Encouraged intake through day to best meet needs, continues to be monitored at this time, will follow per protocol.

## 2021-08-27 NOTE — PROGRESS NOTE ADULT - SUBJECTIVE AND OBJECTIVE BOX
Patient discussed on morning rounds with Dr. Shen       Operation / Date: 8/25/2021 Laparoscopic, robotic assisted, redo hiatal hernia repair     SUBJECTIVE ASSESSMENT:  Patient seen this morning at bedside , doing well and not offering any complaints at this time. Patient states her incisional pain is improved compared to yesterday. She denies any chest pain or shortness of breath.     Vital Signs Last 24 Hrs  T(C): 37 (27 Aug 2021 09:37), Max: 37 (27 Aug 2021 09:37)  T(F): 98.6 (27 Aug 2021 09:37), Max: 98.6 (27 Aug 2021 09:37)  HR: 68 (27 Aug 2021 11:41) (58 - 76)  BP: 147/79 (27 Aug 2021 11:41) (121/66 - 177/93)  BP(mean): 107 (27 Aug 2021 11:41) (89 - 126)  RR: 14 (27 Aug 2021 11:41) (8 - 19)  SpO2: 94% (27 Aug 2021 11:41) (92% - 100%)  I&O's Detail    26 Aug 2021 07:01  -  27 Aug 2021 07:00  --------------------------------------------------------  IN:    IV PiggyBack: 100 mL    Lactated Ringers: 675 mL  Total IN: 775 mL    OUT:    Chest Tube (mL): 10 mL    Voided (mL): 1100 mL  Total OUT: 1110 mL    Total NET: -335 mL    CHEST TUBE:  No  ANTHONY DRAIN: No  EPICARDIAL WIRES: No  TIE DOWNS: No  POSADAS: No    PHYSICAL EXAM:    General: Patient lying comfortably in bed, no acute distress     Neurological: Alert and oriented. No focal neurological deficits     Cardiovascular: S1S2, RRR, no murmurs appreciated on exam     Respiratory: Clear to ausculation bilaterally     Gastrointestinal: Abdomen soft, non tender, non distended     Extremities: Warm and well perfused. No edema or calf tenderness     Vascular: Peripheral pulses 2+ bilaterally     Incision Sites: Abdominal incisions C/D/I   Left pigtail site covered with occlusive dressing     LABS:                        11.8   6.45  )-----------( 212      ( 27 Aug 2021 06:03 )             35.2       COUMADIN:  Yes/No. REASON: .    PT/INR - ( 27 Aug 2021 06:03 )   PT: 11.1 sec;   INR: 0.92          PTT - ( 27 Aug 2021 06:03 )  PTT:24.1 sec    08-27    139  |  103  |  14  ----------------------------<  101<H>  3.8   |  28  |  0.80    Ca    9.3      27 Aug 2021 06:03  Mg     2.2     08-27    TPro  6.0  /  Alb  3.9  /  TBili  0.3  /  DBili  x   /  AST  155<H>  /  ALT  169<H>  /  AlkPhos  86  08-27          MEDICATIONS  (STANDING):  citalopram 20 milliGRAM(s) Oral daily  clonazePAM  Tablet 1 milliGRAM(s) Oral at bedtime  heparin   Injectable 5000 Unit(s) SubCutaneous every 8 hours  lactated ringers. 1000 milliLiter(s) (75 mL/Hr) IV Continuous <Continuous>  lidocaine   4% Patch 1 Patch Transdermal daily  ondansetron Injectable 4 milliGRAM(s) IV Push every 6 hours  pantoprazole    Tablet 40 milliGRAM(s) Oral before breakfast  polyethylene glycol 3350 17 Gram(s) Oral daily  propranolol 30 milliGRAM(s) Oral daily  senna 2 Tablet(s) Oral at bedtime    MEDICATIONS  (PRN):  acetaminophen   Tablet .. 650 milliGRAM(s) Oral every 6 hours PRN Mild Pain (1 - 3)  HYDROmorphone   Tablet 2 milliGRAM(s) Oral every 6 hours PRN Moderate Pain (4 - 6)  metoclopramide Injectable 10 milliGRAM(s) IV Push daily PRN Nausea  zolpidem 5 milliGRAM(s) Oral at bedtime PRN Insomnia  zolpidem 5 milliGRAM(s) Oral at bedtime PRN Insomnia        RADIOLOGY & ADDITIONAL TESTS:     Patient discussed on morning rounds with Dr. Shen       Operation / Date: 8/25/2021 Laparoscopic, robotic assisted, redo hiatal hernia repair     SUBJECTIVE ASSESSMENT:  Patient seen this morning at bedside , doing well and not offering any complaints at this time. Patient states her incisional pain is improved compared to yesterday. She denies any chest pain or shortness of breath.     Vital Signs Last 24 Hrs  T(C): 37 (27 Aug 2021 09:37), Max: 37 (27 Aug 2021 09:37)  T(F): 98.6 (27 Aug 2021 09:37), Max: 98.6 (27 Aug 2021 09:37)  HR: 68 (27 Aug 2021 11:41) (58 - 76)  BP: 147/79 (27 Aug 2021 11:41) (121/66 - 177/93)  BP(mean): 107 (27 Aug 2021 11:41) (89 - 126)  RR: 14 (27 Aug 2021 11:41) (8 - 19)  SpO2: 94% (27 Aug 2021 11:41) (92% - 100%)  I&O's Detail    26 Aug 2021 07:01  -  27 Aug 2021 07:00  --------------------------------------------------------  IN:    IV PiggyBack: 100 mL    Lactated Ringers: 675 mL  Total IN: 775 mL    OUT:    Chest Tube (mL): 10 mL    Voided (mL): 1100 mL  Total OUT: 1110 mL    Total NET: -335 mL    CHEST TUBE:  No  ANTHONY DRAIN: No  EPICARDIAL WIRES: No  TIE DOWNS: No  POSADAS: No    PHYSICAL EXAM:    General: Patient lying comfortably in bed, no acute distress     Neurological: Alert and oriented. No focal neurological deficits     Cardiovascular: S1S2, RRR, no murmurs appreciated on exam     Respiratory: Clear to ausculation bilaterally     Gastrointestinal: Abdomen soft, non tender, non distended     Extremities: Warm and well perfused. No edema or calf tenderness     Vascular: Peripheral pulses 2+ bilaterally     Incision Sites: Abdominal incisions C/D/I   Left pigtail site covered with occlusive dressing     LABS:                        11.8   6.45  )-----------( 212      ( 27 Aug 2021 06:03 )             35.2       COUMADIN:  No    PT/INR - ( 27 Aug 2021 06:03 )   PT: 11.1 sec;   INR: 0.92          PTT - ( 27 Aug 2021 06:03 )  PTT:24.1 sec    08-27    139  |  103  |  14  ----------------------------<  101<H>  3.8   |  28  |  0.80    Ca    9.3      27 Aug 2021 06:03  Mg     2.2     08-27    TPro  6.0  /  Alb  3.9  /  TBili  0.3  /  DBili  x   /  AST  155<H>  /  ALT  169<H>  /  AlkPhos  86  08-27          MEDICATIONS  (STANDING):  citalopram 20 milliGRAM(s) Oral daily  clonazePAM  Tablet 1 milliGRAM(s) Oral at bedtime  heparin   Injectable 5000 Unit(s) SubCutaneous every 8 hours  lactated ringers. 1000 milliLiter(s) (75 mL/Hr) IV Continuous <Continuous>  lidocaine   4% Patch 1 Patch Transdermal daily  ondansetron Injectable 4 milliGRAM(s) IV Push every 6 hours  pantoprazole    Tablet 40 milliGRAM(s) Oral before breakfast  polyethylene glycol 3350 17 Gram(s) Oral daily  propranolol 30 milliGRAM(s) Oral daily  senna 2 Tablet(s) Oral at bedtime    MEDICATIONS  (PRN):  acetaminophen   Tablet .. 650 milliGRAM(s) Oral every 6 hours PRN Mild Pain (1 - 3)  HYDROmorphone   Tablet 2 milliGRAM(s) Oral every 6 hours PRN Moderate Pain (4 - 6)  metoclopramide Injectable 10 milliGRAM(s) IV Push daily PRN Nausea  zolpidem 5 milliGRAM(s) Oral at bedtime PRN Insomnia  zolpidem 5 milliGRAM(s) Oral at bedtime PRN Insomnia        RADIOLOGY & ADDITIONAL TESTS:

## 2021-08-27 NOTE — PROGRESS NOTE ADULT - ASSESSMENT
A/P: 66 year old female, with PMHx of HTN, TIA 7 years ago, COVID 1/2021, IBS, Migraines, Chronic neck and back pain, s/p hiatal hernia repair 2010 now with recurrent hernia with recent admission to [facility] in the beginning of August for acute abdominal pain and vomiting. Patient underwent EGD on 7/30 revealing gastritis and had to be aborted due to hypoxia and apnea. She was started on Pepcid and decadron. Her diet was slowly advanced with improvement in her symptoms and she was discharged home on 8/4 with outpatient follow up. She now presented to [facility] to 8/25 for planned procedure and underwent Laparoscopic RA redo hiatal hernia repair. Procedure uncomplicated and doing well. Patient tolerated clear liquid diet on POD#1 advanced to full liquid diet on POD#2.     Neurovascular: Incisional pain controlled with current regimen   - Continue PO dilaudid  - Continue IV Toradol and Tylenol PRN   - Anxiety/Depression - continue citalopram 20mg, Klonopin 1mg   - Patient complaining of migraines this morning, home imitrex given x 1     Cardiovascular: Hemodynamically stable. HR controlled.  - HTN continue losartan 50mg daily. Restart propanolol 30mg today (home dose 60mg)     Respiratory: 02 Sat = 94% on RA   - Encourage C+DB and Use of IS 10x / hr while awake.  - CXR stable f/u in AM     GI: Stable.  - Advance to full liquid diet   - Continue standing zofran 4mg Q6   - Reglan PRN   - Continue protonix 40mg for GI ppx   - Continue bowel regimen     Renal / : BUN/Cr 14/0.80. No active issues   - Monitor renal function.  - Monitor I/O's.    Endocrine: hgba1c 5.0, TSH wnl   - No active issues      Prophylaxis:  - DVT prophylaxis with 5000 SubQ Heparin q8h.  - SCD's    Disposition: Home when medically ready, likely tomorrow

## 2021-08-27 NOTE — DIETITIAN INITIAL EVALUATION ADULT. - OTHER CALCULATIONS
Ideal body weight used for calculations as pt >120% of IBW. Nutrient needs based on [facility] standards of care for maintenance in adults adjusted for age, post-op needs

## 2021-08-28 LAB
ANION GAP SERPL CALC-SCNC: 9 MMOL/L — SIGNIFICANT CHANGE UP (ref 5–17)
BUN SERPL-MCNC: 9 MG/DL — SIGNIFICANT CHANGE UP (ref 7–23)
CALCIUM SERPL-MCNC: 9.4 MG/DL — SIGNIFICANT CHANGE UP (ref 8.4–10.5)
CHLORIDE SERPL-SCNC: 100 MMOL/L — SIGNIFICANT CHANGE UP (ref 96–108)
CO2 SERPL-SCNC: 28 MMOL/L — SIGNIFICANT CHANGE UP (ref 22–31)
CREAT SERPL-MCNC: 0.78 MG/DL — SIGNIFICANT CHANGE UP (ref 0.5–1.3)
GLUCOSE SERPL-MCNC: 106 MG/DL — HIGH (ref 70–99)
HCT VFR BLD CALC: 36.2 % — SIGNIFICANT CHANGE UP (ref 34.5–45)
HGB BLD-MCNC: 12.3 G/DL — SIGNIFICANT CHANGE UP (ref 11.5–15.5)
MAGNESIUM SERPL-MCNC: 1.9 MG/DL — SIGNIFICANT CHANGE UP (ref 1.6–2.6)
MCHC RBC-ENTMCNC: 33.2 PG — SIGNIFICANT CHANGE UP (ref 27–34)
MCHC RBC-ENTMCNC: 34 GM/DL — SIGNIFICANT CHANGE UP (ref 32–36)
MCV RBC AUTO: 97.8 FL — SIGNIFICANT CHANGE UP (ref 80–100)
NRBC # BLD: 0 /100 WBCS — SIGNIFICANT CHANGE UP (ref 0–0)
PLATELET # BLD AUTO: 205 K/UL — SIGNIFICANT CHANGE UP (ref 150–400)
POTASSIUM SERPL-MCNC: 4 MMOL/L — SIGNIFICANT CHANGE UP (ref 3.5–5.3)
POTASSIUM SERPL-SCNC: 4 MMOL/L — SIGNIFICANT CHANGE UP (ref 3.5–5.3)
RBC # BLD: 3.7 M/UL — LOW (ref 3.8–5.2)
RBC # FLD: 13.3 % — SIGNIFICANT CHANGE UP (ref 10.3–14.5)
SODIUM SERPL-SCNC: 137 MMOL/L — SIGNIFICANT CHANGE UP (ref 135–145)
WBC # BLD: 6.11 K/UL — SIGNIFICANT CHANGE UP (ref 3.8–10.5)
WBC # FLD AUTO: 6.11 K/UL — SIGNIFICANT CHANGE UP (ref 3.8–10.5)

## 2021-08-28 PROCEDURE — 71045 X-RAY EXAM CHEST 1 VIEW: CPT | Mod: 26

## 2021-08-28 RX ORDER — SUMATRIPTAN SUCCINATE 4 MG/.5ML
100 INJECTION, SOLUTION SUBCUTANEOUS DAILY
Refills: 0 | Status: DISCONTINUED | OUTPATIENT
Start: 2021-08-28 | End: 2021-08-29

## 2021-08-28 RX ADMIN — ONDANSETRON 4 MILLIGRAM(S): 8 TABLET, FILM COATED ORAL at 12:47

## 2021-08-28 RX ADMIN — POLYETHYLENE GLYCOL 3350 17 GRAM(S): 17 POWDER, FOR SOLUTION ORAL at 13:13

## 2021-08-28 RX ADMIN — PANTOPRAZOLE SODIUM 40 MILLIGRAM(S): 20 TABLET, DELAYED RELEASE ORAL at 08:01

## 2021-08-28 RX ADMIN — HYDROMORPHONE HYDROCHLORIDE 2 MILLIGRAM(S): 2 INJECTION INTRAMUSCULAR; INTRAVENOUS; SUBCUTANEOUS at 08:05

## 2021-08-28 RX ADMIN — SUMATRIPTAN SUCCINATE 100 MILLIGRAM(S): 4 INJECTION, SOLUTION SUBCUTANEOUS at 11:55

## 2021-08-28 RX ADMIN — HYDROMORPHONE HYDROCHLORIDE 2 MILLIGRAM(S): 2 INJECTION INTRAMUSCULAR; INTRAVENOUS; SUBCUTANEOUS at 22:10

## 2021-08-28 RX ADMIN — LIDOCAINE 1 PATCH: 4 CREAM TOPICAL at 08:35

## 2021-08-28 RX ADMIN — HEPARIN SODIUM 5000 UNIT(S): 5000 INJECTION INTRAVENOUS; SUBCUTANEOUS at 13:12

## 2021-08-28 RX ADMIN — LIDOCAINE 1 PATCH: 4 CREAM TOPICAL at 17:25

## 2021-08-28 RX ADMIN — HYDROMORPHONE HYDROCHLORIDE 2 MILLIGRAM(S): 2 INJECTION INTRAMUSCULAR; INTRAVENOUS; SUBCUTANEOUS at 07:09

## 2021-08-28 RX ADMIN — CITALOPRAM 20 MILLIGRAM(S): 10 TABLET, FILM COATED ORAL at 12:47

## 2021-08-28 RX ADMIN — SUMATRIPTAN SUCCINATE 100 MILLIGRAM(S): 4 INJECTION, SOLUTION SUBCUTANEOUS at 12:20

## 2021-08-28 RX ADMIN — ONDANSETRON 4 MILLIGRAM(S): 8 TABLET, FILM COATED ORAL at 08:01

## 2021-08-28 RX ADMIN — Medication 1 MILLIGRAM(S): at 21:14

## 2021-08-28 RX ADMIN — LIDOCAINE 1 PATCH: 4 CREAM TOPICAL at 12:47

## 2021-08-28 RX ADMIN — HYDROMORPHONE HYDROCHLORIDE 2 MILLIGRAM(S): 2 INJECTION INTRAMUSCULAR; INTRAVENOUS; SUBCUTANEOUS at 08:35

## 2021-08-28 RX ADMIN — ONDANSETRON 4 MILLIGRAM(S): 8 TABLET, FILM COATED ORAL at 17:31

## 2021-08-28 RX ADMIN — LOSARTAN POTASSIUM 50 MILLIGRAM(S): 100 TABLET, FILM COATED ORAL at 08:01

## 2021-08-28 RX ADMIN — ZOLPIDEM TARTRATE 5 MILLIGRAM(S): 10 TABLET ORAL at 21:14

## 2021-08-28 RX ADMIN — Medication 10 MILLIGRAM(S): at 21:20

## 2021-08-28 RX ADMIN — HEPARIN SODIUM 5000 UNIT(S): 5000 INJECTION INTRAVENOUS; SUBCUTANEOUS at 21:14

## 2021-08-28 RX ADMIN — HYDROMORPHONE HYDROCHLORIDE 2 MILLIGRAM(S): 2 INJECTION INTRAMUSCULAR; INTRAVENOUS; SUBCUTANEOUS at 21:14

## 2021-08-28 RX ADMIN — HEPARIN SODIUM 5000 UNIT(S): 5000 INJECTION INTRAVENOUS; SUBCUTANEOUS at 07:56

## 2021-08-28 RX ADMIN — SENNA PLUS 2 TABLET(S): 8.6 TABLET ORAL at 21:14

## 2021-08-28 NOTE — PROGRESS NOTE ADULT - SUBJECTIVE AND OBJECTIVE BOX
Patient discussed on morning rounds with Dr. Shen     Operation / Date: 8/25/2021 Laparoscopic, robotic assisted, redo hiatal hernia repair     SUBJECTIVE ASSESSMENT:  Patient states she threw up yesterday after having ice cream and she is now scared to eat.  She has since tolerated Jello.  She is also complaining of migraine.      Vital Signs Last 24 Hrs  T(C): 36.6 (28 Aug 2021 14:54), Max: 37.1 (28 Aug 2021 00:00)  T(F): 97.9 (28 Aug 2021 14:54), Max: 98.8 (28 Aug 2021 00:00)  HR: 71 (28 Aug 2021 14:54) (71 - 90)  BP: 113/78 (28 Aug 2021 14:54) (113/78 - 143/85)  BP(mean): 81 (27 Aug 2021 16:12) (81 - 81)  RR: 18 (28 Aug 2021 14:54) (14 - 18)  SpO2: 94% (28 Aug 2021 14:54) (93% - 95%)  I&O's Detail    27 Aug 2021 07:01  -  28 Aug 2021 07:00  --------------------------------------------------------  IN:  Total IN: 0 mL    OUT:    Lactated Ringers: 0 mL  Total OUT: 0 mL    Total NET: 0 mL    CHEST TUBE:  No.   ANTHONY DRAIN:  No.  EPICARDIAL WIRES: No.  TIE DOWNS: No.  POSADAS: No.    PHYSICAL EXAM:    General: Lying in bed comfortable, NAD    Neurological: A&O x 3, non focal     Cardiovascular: S1S2 RRR No M/G/R    Respiratory: CTA b/l No W/R/R     Gastrointestinal: soft NT/ND    Extremities: no edema     Vascular: warm and well perfused     Incision Sites: belly incisions healing well, no drainage, no dehiscence    LABS:                        12.3   6.11  )-----------( 205      ( 28 Aug 2021 05:50 )             36.2       PT/INR - ( 27 Aug 2021 06:03 )   PT: 11.1 sec;   INR: 0.92          PTT - ( 27 Aug 2021 06:03 )  PTT:24.1 sec    08-28    137  |  100  |  9   ----------------------------<  106<H>  4.0   |  28  |  0.78    Ca    9.4      28 Aug 2021 05:51  Mg     1.9     08-28    TPro  6.0  /  Alb  3.9  /  TBili  0.3  /  DBili  x   /  AST  155<H>  /  ALT  169<H>  /  AlkPhos  86  08-27      MEDICATIONS  (STANDING):  citalopram 20 milliGRAM(s) Oral daily  clonazePAM  Tablet 1 milliGRAM(s) Oral at bedtime  heparin   Injectable 5000 Unit(s) SubCutaneous every 8 hours  lactated ringers. 1000 milliLiter(s) (75 mL/Hr) IV Continuous <Continuous>  lidocaine   4% Patch 1 Patch Transdermal daily  losartan 50 milliGRAM(s) Oral daily  ondansetron Injectable 4 milliGRAM(s) IV Push every 6 hours  pantoprazole    Tablet 40 milliGRAM(s) Oral before breakfast  polyethylene glycol 3350 17 Gram(s) Oral daily  propranolol 30 milliGRAM(s) Oral daily  senna 2 Tablet(s) Oral at bedtime    MEDICATIONS  (PRN):  acetaminophen   Tablet .. 650 milliGRAM(s) Oral every 6 hours PRN Mild Pain (1 - 3)  HYDROmorphone   Tablet 2 milliGRAM(s) Oral every 6 hours PRN Moderate Pain (4 - 6)  metoclopramide Injectable 10 milliGRAM(s) IV Push daily PRN Nausea  SUMAtriptan 100 milliGRAM(s) Oral daily PRN Migraine  zolpidem 5 milliGRAM(s) Oral at bedtime PRN Insomnia  zolpidem 5 milliGRAM(s) Oral at bedtime PRN Insomnia        RADIOLOGY & ADDITIONAL TESTS:

## 2021-08-28 NOTE — PROGRESS NOTE ADULT - ASSESSMENT
A/P: 66 year old female, with PMHx of HTN, TIA 7 years ago, COVID 1/2021, IBS, Migraines, Chronic neck and back pain, s/p hiatal hernia repair 2010 now with recurrent hernia with recent admission to [facility] in the beginning of August for acute abdominal pain and vomiting. Patient underwent EGD on 7/30 revealing gastritis and had to be aborted due to hypoxia and apnea. She was started on Pepcid and decadron. Her diet was slowly advanced with improvement in her symptoms and she was discharged home on 8/4 with outpatient follow up. She now presented to [facility] to 8/25 for planned procedure and underwent Laparoscopic RA redo hiatal hernia repair. Procedure uncomplicated and doing well. Patient tolerated clear liquid diet on POD#1 advanced to full liquid diet on POD#2.  Today POD 3 diet advanced to puree.      Neurovascular: Incisional pain controlled with current regimen   - Continue PO dilaudid  - Continue IV Toradol and Tylenol PRN   - Anxiety/Depression - continue citalopram 20mg, Klonopin 1mg   - Patient complaining of migraines this morning, home imitrex restarted    Cardiovascular: Hemodynamically stable. HR controlled.  - HTN continue losartan 50mg daily.   - tolerating propanolol     Respiratory: saturating well on room air  - Encourage C+DB and Use of IS 10x / hr while awake.  - CXR stable f/u in AM     GI: Stable.  - Advance to puree diet  - Continue standing zofran 4mg Q6   - Reglan PRN   - Continue protonix 40mg for GI ppx   - Continue bowel regimen     Renal / : BUN/Cr 14/0.80. No active issues   - Monitor renal function.  - Monitor I/O's.    Endocrine: hgba1c 5.0, TSH wnl   - No active issues      Prophylaxis:  - DVT prophylaxis with 5000 SubQ Heparin q8h.  - SCD's    Disposition: Home when medically ready, likely tomorrow

## 2021-08-29 ENCOUNTER — TRANSCRIPTION ENCOUNTER (OUTPATIENT)
Age: 66
End: 2021-08-29

## 2021-08-29 VITALS
DIASTOLIC BLOOD PRESSURE: 85 MMHG | RESPIRATION RATE: 17 BRPM | HEART RATE: 83 BPM | OXYGEN SATURATION: 95 % | TEMPERATURE: 98 F | SYSTOLIC BLOOD PRESSURE: 134 MMHG

## 2021-08-29 LAB
ANION GAP SERPL CALC-SCNC: 13 MMOL/L — SIGNIFICANT CHANGE UP (ref 5–17)
BUN SERPL-MCNC: 8 MG/DL — SIGNIFICANT CHANGE UP (ref 7–23)
CALCIUM SERPL-MCNC: 9.1 MG/DL — SIGNIFICANT CHANGE UP (ref 8.4–10.5)
CHLORIDE SERPL-SCNC: 102 MMOL/L — SIGNIFICANT CHANGE UP (ref 96–108)
CO2 SERPL-SCNC: 25 MMOL/L — SIGNIFICANT CHANGE UP (ref 22–31)
CREAT SERPL-MCNC: 0.68 MG/DL — SIGNIFICANT CHANGE UP (ref 0.5–1.3)
GLUCOSE SERPL-MCNC: 86 MG/DL — SIGNIFICANT CHANGE UP (ref 70–99)
HCT VFR BLD CALC: 36.6 % — SIGNIFICANT CHANGE UP (ref 34.5–45)
HGB BLD-MCNC: 11.9 G/DL — SIGNIFICANT CHANGE UP (ref 11.5–15.5)
MAGNESIUM SERPL-MCNC: 2 MG/DL — SIGNIFICANT CHANGE UP (ref 1.6–2.6)
MCHC RBC-ENTMCNC: 32.5 GM/DL — SIGNIFICANT CHANGE UP (ref 32–36)
MCHC RBC-ENTMCNC: 32.6 PG — SIGNIFICANT CHANGE UP (ref 27–34)
MCV RBC AUTO: 100.3 FL — HIGH (ref 80–100)
NRBC # BLD: 0 /100 WBCS — SIGNIFICANT CHANGE UP (ref 0–0)
PLATELET # BLD AUTO: 197 K/UL — SIGNIFICANT CHANGE UP (ref 150–400)
POTASSIUM SERPL-MCNC: 3.8 MMOL/L — SIGNIFICANT CHANGE UP (ref 3.5–5.3)
POTASSIUM SERPL-SCNC: 3.8 MMOL/L — SIGNIFICANT CHANGE UP (ref 3.5–5.3)
RBC # BLD: 3.65 M/UL — LOW (ref 3.8–5.2)
RBC # FLD: 13.2 % — SIGNIFICANT CHANGE UP (ref 10.3–14.5)
SODIUM SERPL-SCNC: 140 MMOL/L — SIGNIFICANT CHANGE UP (ref 135–145)
WBC # BLD: 4.16 K/UL — SIGNIFICANT CHANGE UP (ref 3.8–10.5)
WBC # FLD AUTO: 4.16 K/UL — SIGNIFICANT CHANGE UP (ref 3.8–10.5)

## 2021-08-29 PROCEDURE — 71045 X-RAY EXAM CHEST 1 VIEW: CPT | Mod: 26

## 2021-08-29 RX ORDER — LOSARTAN POTASSIUM 100 MG/1
1 TABLET, FILM COATED ORAL
Qty: 30 | Refills: 0
Start: 2021-08-29 | End: 2021-09-27

## 2021-08-29 RX ORDER — ACETAMINOPHEN 500 MG
2 TABLET ORAL
Qty: 112 | Refills: 0
Start: 2021-08-29 | End: 2021-09-11

## 2021-08-29 RX ORDER — CITALOPRAM 10 MG/1
1 TABLET, FILM COATED ORAL
Qty: 30 | Refills: 0
Start: 2021-08-29 | End: 2021-09-27

## 2021-08-29 RX ORDER — PROPRANOLOL HCL 160 MG
1 CAPSULE, EXTENDED RELEASE 24HR ORAL
Qty: 0 | Refills: 0 | DISCHARGE

## 2021-08-29 RX ORDER — CITALOPRAM 10 MG/1
1 TABLET, FILM COATED ORAL
Qty: 0 | Refills: 0 | DISCHARGE

## 2021-08-29 RX ORDER — SUMATRIPTAN SUCCINATE 4 MG/.5ML
1 INJECTION, SOLUTION SUBCUTANEOUS
Qty: 30 | Refills: 0
Start: 2021-08-29 | End: 2021-09-27

## 2021-08-29 RX ORDER — SENNA PLUS 8.6 MG/1
2 TABLET ORAL
Qty: 28 | Refills: 0
Start: 2021-08-29 | End: 2021-09-11

## 2021-08-29 RX ORDER — ZOLPIDEM TARTRATE 10 MG/1
1 TABLET ORAL
Qty: 0 | Refills: 0 | DISCHARGE
Start: 2021-08-29

## 2021-08-29 RX ORDER — DEXLANSOPRAZOLE 30 MG/1
1 CAPSULE, DELAYED RELEASE ORAL
Qty: 0 | Refills: 0 | DISCHARGE

## 2021-08-29 RX ORDER — POLYETHYLENE GLYCOL 3350 17 G/17G
17 POWDER, FOR SOLUTION ORAL
Qty: 119 | Refills: 0
Start: 2021-08-29 | End: 2021-09-04

## 2021-08-29 RX ORDER — PANTOPRAZOLE SODIUM 20 MG/1
1 TABLET, DELAYED RELEASE ORAL
Qty: 30 | Refills: 0
Start: 2021-08-29 | End: 2021-09-27

## 2021-08-29 RX ORDER — LOSARTAN POTASSIUM 100 MG/1
1 TABLET, FILM COATED ORAL
Qty: 0 | Refills: 0 | DISCHARGE

## 2021-08-29 RX ORDER — PROPRANOLOL HCL 160 MG
3 CAPSULE, EXTENDED RELEASE 24HR ORAL
Qty: 90 | Refills: 0
Start: 2021-08-29 | End: 2021-09-27

## 2021-08-29 RX ORDER — ONDANSETRON 8 MG/1
1 TABLET, FILM COATED ORAL
Qty: 0 | Refills: 0 | DISCHARGE

## 2021-08-29 RX ORDER — CLONAZEPAM 1 MG
0 TABLET ORAL
Qty: 0 | Refills: 0 | DISCHARGE

## 2021-08-29 RX ORDER — SUMATRIPTAN SUCCINATE 4 MG/.5ML
1 INJECTION, SOLUTION SUBCUTANEOUS
Qty: 0 | Refills: 0 | DISCHARGE

## 2021-08-29 RX ORDER — CLONAZEPAM 1 MG
1 TABLET ORAL
Qty: 0 | Refills: 0 | DISCHARGE
Start: 2021-08-29

## 2021-08-29 RX ORDER — CETIRIZINE HYDROCHLORIDE 10 MG/1
1 TABLET ORAL
Qty: 0 | Refills: 0 | DISCHARGE

## 2021-08-29 RX ORDER — LINACLOTIDE 145 UG/1
1 CAPSULE, GELATIN COATED ORAL
Qty: 0 | Refills: 0 | DISCHARGE

## 2021-08-29 RX ORDER — ZOLPIDEM TARTRATE 10 MG/1
1 TABLET ORAL
Qty: 0 | Refills: 0 | DISCHARGE

## 2021-08-29 RX ADMIN — ONDANSETRON 4 MILLIGRAM(S): 8 TABLET, FILM COATED ORAL at 11:04

## 2021-08-29 RX ADMIN — ZOLPIDEM TARTRATE 5 MILLIGRAM(S): 10 TABLET ORAL at 00:00

## 2021-08-29 RX ADMIN — LOSARTAN POTASSIUM 50 MILLIGRAM(S): 100 TABLET, FILM COATED ORAL at 05:32

## 2021-08-29 RX ADMIN — ONDANSETRON 4 MILLIGRAM(S): 8 TABLET, FILM COATED ORAL at 05:32

## 2021-08-29 RX ADMIN — PANTOPRAZOLE SODIUM 40 MILLIGRAM(S): 20 TABLET, DELAYED RELEASE ORAL at 05:32

## 2021-08-29 RX ADMIN — HEPARIN SODIUM 5000 UNIT(S): 5000 INJECTION INTRAVENOUS; SUBCUTANEOUS at 05:32

## 2021-08-29 RX ADMIN — LIDOCAINE 1 PATCH: 4 CREAM TOPICAL at 10:18

## 2021-08-29 RX ADMIN — CITALOPRAM 20 MILLIGRAM(S): 10 TABLET, FILM COATED ORAL at 10:11

## 2021-08-29 RX ADMIN — LIDOCAINE 1 PATCH: 4 CREAM TOPICAL at 00:59

## 2021-08-29 RX ADMIN — HYDROMORPHONE HYDROCHLORIDE 2 MILLIGRAM(S): 2 INJECTION INTRAMUSCULAR; INTRAVENOUS; SUBCUTANEOUS at 13:45

## 2021-08-29 RX ADMIN — ONDANSETRON 4 MILLIGRAM(S): 8 TABLET, FILM COATED ORAL at 00:00

## 2021-08-29 RX ADMIN — SUMATRIPTAN SUCCINATE 100 MILLIGRAM(S): 4 INJECTION, SOLUTION SUBCUTANEOUS at 10:11

## 2021-08-29 NOTE — DISCHARGE NOTE NURSING/CASE MANAGEMENT/SOCIAL WORK - NSDCFUADDAPPT_GEN_ALL_CORE_FT
-Please follow up with Dr. Baker on 9/2/21 at 2:30pm.   The office is located at [facility], [facility], 4th floor. Call us with any questions, #[phone].  -Please follow up with Dr. Marilu Chaves (referring MD) on 9/13/21 at 10:30am.

## 2021-08-29 NOTE — PROGRESS NOTE ADULT - SUBJECTIVE AND OBJECTIVE BOX
Patient discussed on morning rounds with Dr. Shen    Operation / Date: 8/25/21 laparoscopic, robotic, re-do hiatal hernia repair, gastropexy    Surgeon: Dr. Baker    Referring Physician: Dr. Shen    SUBJECTIVE ASSESSMENT:  66y Female seen and examined. Feels well, reports having trouble getting her mashed potatoes down yesterday, but no n/v/discomfort. She is passing gas and ambulating on RA. Denies fever, chest pain, palpitations SOB, abdominal pain, n/v.     Hospital Course:  66 year old female, with PMHx of HTN, TIA 7 years ago, COVID 1/2021, IBS, Migraines, Chronic neck and back pain, s/p hiatal hernia repair 2010 now with recurrent hernia with recent admission to [facility] in the beginning of August for acute abdominal pain and vomiting. Patient underwent EGD on 7/30 revealing gastritis and had to be aborted due to hypoxia and apnea. She was started on Pepcid and decadron. Her diet was slowly advanced with improvement in her symptoms and she was discharged home on 8/4 with outpatient follow up. She now presented to [facility] to 8/25 for planned procedure and underwent Laparoscopic RA redo hiatal hernia repair. Procedure uncomplicated and doing well. Patient tolerated clear liquid diet on POD#1 advanced to full liquid diet on POD#2.  POD 3 diet advanced to puree, trouble with her mashed potatoes, decreased back to fulls. Today POD4, patient feels well, tolerating her full liquid diet, ambulating on RA, + flatus. Per Dr. Shen, patient is ready for discharge.    35 minutes was spent with the patient reviewing the discharge material including medications, follow up appointments, recovery, concerning symptoms, and how to contact their health care providers if they have questions.        Vital Signs Last 24 Hrs  T(C): 36.8 (29 Aug 2021 08:32), Max: 37.1 (28 Aug 2021 10:03)  T(F): 98.2 (29 Aug 2021 08:32), Max: 98.7 (28 Aug 2021 10:03)  HR: 83 (29 Aug 2021 08:32) (71 - 85)  BP: 134/85 (29 Aug 2021 08:32) (112/78 - 144/80)  BP(mean): --  RR: 17 (29 Aug 2021 08:32) (17 - 18)  SpO2: 95% (29 Aug 2021 08:32) (94% - 98%)  I&O's Detail    28 Aug 2021 07:01  -  29 Aug 2021 07:00  --------------------------------------------------------  IN:  Total IN: 0 mL    OUT:    Voided (mL): 301 mL  Total OUT: 301 mL    Total NET: -301 mL      PHYSICAL EXAM:    General: Patient lying comfortably in bed, no acute distress     Neurological: Alert and oriented. No focal neurological deficits     Cardiovascular: S1S2, RRR, no murmurs appreciated on exam     Respiratory: Clear to ausculation bilaterally, no wheeze/rhonchi/rales    Gastrointestinal: + BS, soft, non tender, non distended     Extremities: Warm and well perfused. No edema, no calf tenderness     Vascular: palpable peripheral pulses b/l     Incision Sites: lap sites; clean, open to air, no signs of infeciton.   LABS:                        11.9   4.16  )-----------( 197      ( 29 Aug 2021 08:08 )             36.6       COUMADIN: No        08-29    140  |  102  |  8   ----------------------------<  86  3.8   |  25  |  0.68    Ca    9.1      29 Aug 2021 08:08  Mg     2.0     08-29            MEDICATIONS  (STANDING):  SEE MED REC      Discharge CXR:  < from: Xray Chest 1 View- PORTABLE-Routine (Xray Chest 1 View- PORTABLE-Routine in AM.) (08.28.21 @ 06:47) >    Frontal examination of the chest demonstrates cardiomegaly. Elevation right hemidiaphragm. Silhouetting left hemidiaphragm which reflect infiltrate and/or effusion. Visualized osseous structures are within normal limits.    IMPRESSION: Silhouetting left hemidiaphragm which may reflect infiltrate and/or effusion    < end of copied text >      Discharge ECHO:

## 2021-08-29 NOTE — DISCHARGE NOTE PROVIDER - NSDCFUADDAPPT_GEN_ALL_CORE_FT
-Please follow up with Dr. Baker on 9/2/21 at 2:30pm.   The office is located at [facility], [facility], 4th floor. Call us with any questions, #[phone].  -Please follow up with Dr. Marilu Chaves (referring MD) on 9/13/21 at 10:30am.

## 2021-08-29 NOTE — DISCHARGE NOTE PROVIDER - NSDCMRMEDTOKEN_GEN_ALL_CORE_FT
acetaminophen 325 mg oral tablet: 2 tab(s) orally every 6 hours, As needed, Mild Pain (1 - 3)  citalopram 20 mg oral tablet: 1 tab(s) orally once a day  clonazePAM 1 mg oral tablet: 1 tab(s) orally once a day (at bedtime)  losartan 50 mg oral tablet: 1 tab(s) orally once a day  pantoprazole 40 mg oral delayed release tablet: 1 tab(s) orally once a day (before a meal)  polyethylene glycol 3350 oral powder for reconstitution: 17 gram(s) orally once a day  propranolol 10 mg oral tablet: 3 tab(s) orally once a day  senna oral tablet: 2 tab(s) orally once a day (at bedtime)  SUMAtriptan 100 mg oral tablet: 1 tab(s) orally once a day, As needed, Migraine  zolpidem 5 mg oral tablet: 1 tab(s) orally once a day (at bedtime), As needed, Insomnia  zolpidem 5 mg oral tablet: 1 tab(s) orally once a day (at bedtime), As needed, Insomnia   acetaminophen 325 mg oral tablet: 2 tab(s) orally every 6 hours, As needed, Mild Pain (1 - 3)  citalopram 20 mg oral tablet: 1 tab(s) orally once a day  clonazePAM 1 mg oral tablet: 1 tab(s) orally once a day (at bedtime)  losartan 50 mg oral tablet: 1 tab(s) orally once a day  pantoprazole 40 mg oral delayed release tablet: 1 tab(s) orally once a day (before a meal)  polyethylene glycol 3350 oral powder for reconstitution: 17 gram(s) orally once a day  propranolol 10 mg oral tablet: 3 tab(s) orally once a day  senna oral tablet: 2 tab(s) orally once a day (at bedtime)  SUMAtriptan 100 mg oral tablet: 1 tab(s) orally once a day, As needed, Migraine  zolpidem 5 mg oral tablet: 1 tab(s) orally once a day (at bedtime), As needed, Insomnia

## 2021-08-29 NOTE — DISCHARGE NOTE PROVIDER - NSDCFUADDINST_GEN_ALL_CORE_FT
-Please adhere to the full liquid diet and directions that were given to you, until you follow up with Dr. Baker.   -Walk daily as tolerated and use your incentive spirometer every hour.    -No driving or strenuous activity/exercise for 6 weeks, or until cleared by your surgeon.    -You may shower.  Be sure to gently clean your incisions with anti-bacterial soap and water daily, pat dry.  You may leave them open to air.    -Call your doctor if you have shortness of breath, chest pain not relieved by pain medication, dizziness, fever >101.5, or increased redness or drainage from incisions.

## 2021-08-29 NOTE — DISCHARGE NOTE PROVIDER - CARE PROVIDER_API CALL
Jacky Baker (MD)  Surgery; Thoracic Surgery  [address], [address] [place] [address]  Phone: [phone]  Fax: [phone]  Follow Up Time:     DEEPAK PETERSEN  Internal Medicine  [address] [place] [address]  Phone: ()-  Fax: ()-  Follow Up Time:

## 2021-08-29 NOTE — DISCHARGE NOTE PROVIDER - NSDCCPTREATMENT_GEN_ALL_CORE_FT
Pt d/c to home. Provided [facility] and [facility] resources. Case closed.   PRINCIPAL PROCEDURE  Procedure: Laparoscopic repair of hiatal hernia without using mesh  Findings and Treatment:

## 2021-08-29 NOTE — DISCHARGE NOTE PROVIDER - HOSPITAL COURSE
66 year old female, with PMHx of HTN, TIA 7 years ago, COVID 1/2021, IBS, Migraines, Chronic neck and back pain, s/p hiatal hernia repair 2010 now with recurrent hernia with recent admission to [facility] in the beginning of August for acute abdominal pain and vomiting. Patient underwent EGD on 7/30 revealing gastritis and had to be aborted due to hypoxia and apnea. She was started on Pepcid and decadron. Her diet was slowly advanced with improvement in her symptoms and she was discharged home on 8/4 with outpatient follow up. She now presented to [facility] to 8/25 for planned procedure and underwent Laparoscopic RA redo hiatal hernia repair. Procedure uncomplicated and doing well. Patient tolerated clear liquid diet on POD#1 advanced to full liquid diet on POD#2.  POD 3 diet advanced to puree, trouble with her mashed potatoes, decreased back to fulls. Today POD4, patient feels well, tolerating her full liquid diet, ambulating on RA, + flatus. Per Dr. Shen, patient is ready for discharge.    35 minutes was spent with the patient reviewing the discharge material including medications, follow up appointments, recovery, concerning symptoms, and how to contact their health care providers if they have questions.

## 2021-08-29 NOTE — DISCHARGE NOTE NURSING/CASE MANAGEMENT/SOCIAL WORK - PATIENT PORTAL LINK FT
You can access the FollowMyHealth Patient Portal offered by [facility] by registering at the following website: [email or web address]. By joining [employer]’s FollowMyHealth portal, you will also be able to view your health information using other applications (apps) compatible with our system.

## 2021-08-30 RX ORDER — ACETAMINOPHEN AND CODEINE 300; 30 MG/1; MG/1
300-30 TABLET ORAL TWICE DAILY
Qty: 6 | Refills: 0 | Status: ACTIVE | COMMUNITY
Start: 2021-08-30 | End: 1900-01-01

## 2021-09-02 ENCOUNTER — APPOINTMENT (OUTPATIENT)
Dept: THORACIC SURGERY | Facility: CLINIC | Age: 66
End: 2021-09-02
Payer: MEDICARE

## 2021-09-02 ENCOUNTER — OUTPATIENT (OUTPATIENT)
Dept: OUTPATIENT SERVICES | Facility: HOSPITAL | Age: 66
LOS: 1 days | End: 2021-09-02
Payer: MEDICARE

## 2021-09-02 VITALS
RESPIRATION RATE: 16 BRPM | SYSTOLIC BLOOD PRESSURE: 130 MMHG | OXYGEN SATURATION: 99 % | TEMPERATURE: 98 F | HEART RATE: 68 BPM | DIASTOLIC BLOOD PRESSURE: 80 MMHG

## 2021-09-02 DIAGNOSIS — Z90.49 ACQUIRED ABSENCE OF OTHER SPECIFIED PARTS OF DIGESTIVE TRACT: Chronic | ICD-10-CM

## 2021-09-02 DIAGNOSIS — Z98.890 OTHER SPECIFIED POSTPROCEDURAL STATES: Chronic | ICD-10-CM

## 2021-09-02 PROCEDURE — 99024 POSTOP FOLLOW-UP VISIT: CPT

## 2021-09-02 PROCEDURE — 71046 X-RAY EXAM CHEST 2 VIEWS: CPT | Mod: 26

## 2021-09-02 PROCEDURE — 71046 X-RAY EXAM CHEST 2 VIEWS: CPT

## 2021-09-02 RX ORDER — ACETAMINOPHEN AND CODEINE PHOSPHATE 300; 15 MG/1; MG/1
300-15 TABLET ORAL TWICE DAILY
Qty: 6 | Refills: 0 | Status: ACTIVE | COMMUNITY
Start: 2021-09-02 | End: 1900-01-01

## 2021-09-10 DIAGNOSIS — M54.9 DORSALGIA, UNSPECIFIED: ICD-10-CM

## 2021-09-10 DIAGNOSIS — I10 ESSENTIAL (PRIMARY) HYPERTENSION: ICD-10-CM

## 2021-09-10 DIAGNOSIS — Z86.73 PERSONAL HISTORY OF TRANSIENT ISCHEMIC ATTACK (TIA), AND CEREBRAL INFARCTION WITHOUT RESIDUAL DEFICITS: ICD-10-CM

## 2021-09-10 DIAGNOSIS — Z86.16 PERSONAL HISTORY OF COVID-19: ICD-10-CM

## 2021-09-10 DIAGNOSIS — K58.9 IRRITABLE BOWEL SYNDROME WITHOUT DIARRHEA: ICD-10-CM

## 2021-09-10 DIAGNOSIS — Z90.49 ACQUIRED ABSENCE OF OTHER SPECIFIED PARTS OF DIGESTIVE TRACT: ICD-10-CM

## 2021-09-10 DIAGNOSIS — G43.909 MIGRAINE, UNSPECIFIED, NOT INTRACTABLE, WITHOUT STATUS MIGRAINOSUS: ICD-10-CM

## 2021-09-10 DIAGNOSIS — G89.29 OTHER CHRONIC PAIN: ICD-10-CM

## 2021-09-10 DIAGNOSIS — J93.83 OTHER PNEUMOTHORAX: ICD-10-CM

## 2021-09-10 DIAGNOSIS — K44.9 DIAPHRAGMATIC HERNIA WITHOUT OBSTRUCTION OR GANGRENE: ICD-10-CM

## 2021-09-10 DIAGNOSIS — E11.9 TYPE 2 DIABETES MELLITUS WITHOUT COMPLICATIONS: ICD-10-CM

## 2021-09-10 DIAGNOSIS — Z98.890 OTHER SPECIFIED POSTPROCEDURAL STATES: ICD-10-CM

## 2021-09-10 DIAGNOSIS — K21.9 GASTRO-ESOPHAGEAL REFLUX DISEASE WITHOUT ESOPHAGITIS: ICD-10-CM

## 2021-09-10 DIAGNOSIS — Z88.0 ALLERGY STATUS TO PENICILLIN: ICD-10-CM

## 2021-09-16 ENCOUNTER — APPOINTMENT (OUTPATIENT)
Dept: THORACIC SURGERY | Facility: CLINIC | Age: 66
End: 2021-09-16
Payer: MEDICARE

## 2021-09-23 ENCOUNTER — APPOINTMENT (OUTPATIENT)
Dept: THORACIC SURGERY | Facility: CLINIC | Age: 66
End: 2021-09-23
Payer: MEDICARE

## 2021-09-23 VITALS
HEART RATE: 59 BPM | DIASTOLIC BLOOD PRESSURE: 64 MMHG | OXYGEN SATURATION: 95 % | SYSTOLIC BLOOD PRESSURE: 114 MMHG | TEMPERATURE: 97.3 F | BODY MASS INDEX: 32.43 KG/M2 | WEIGHT: 183 LBS | RESPIRATION RATE: 17 BRPM | HEIGHT: 63 IN

## 2021-09-23 PROCEDURE — 99024 POSTOP FOLLOW-UP VISIT: CPT

## 2021-09-28 PROCEDURE — 85730 THROMBOPLASTIN TIME PARTIAL: CPT

## 2021-09-28 PROCEDURE — 71045 X-RAY EXAM CHEST 1 VIEW: CPT

## 2021-09-28 PROCEDURE — 85027 COMPLETE CBC AUTOMATED: CPT

## 2021-09-28 PROCEDURE — 83036 HEMOGLOBIN GLYCOSYLATED A1C: CPT

## 2021-09-28 PROCEDURE — 85576 BLOOD PLATELET AGGREGATION: CPT

## 2021-09-28 PROCEDURE — C1769: CPT

## 2021-09-28 PROCEDURE — C9399: CPT

## 2021-09-28 PROCEDURE — 86900 BLOOD TYPING SEROLOGIC ABO: CPT

## 2021-09-28 PROCEDURE — 36415 COLL VENOUS BLD VENIPUNCTURE: CPT

## 2021-09-28 PROCEDURE — 86850 RBC ANTIBODY SCREEN: CPT

## 2021-09-28 PROCEDURE — 80053 COMPREHEN METABOLIC PANEL: CPT

## 2021-09-28 PROCEDURE — 85610 PROTHROMBIN TIME: CPT

## 2021-09-28 PROCEDURE — 80048 BASIC METABOLIC PNL TOTAL CA: CPT

## 2021-09-28 PROCEDURE — 83735 ASSAY OF MAGNESIUM: CPT

## 2021-09-28 PROCEDURE — S2900: CPT

## 2021-09-28 PROCEDURE — 86901 BLOOD TYPING SEROLOGIC RH(D): CPT

## 2021-09-28 PROCEDURE — 84443 ASSAY THYROID STIM HORMONE: CPT

## 2021-10-01 ENCOUNTER — OUTPATIENT (OUTPATIENT)
Dept: OUTPATIENT SERVICES | Facility: HOSPITAL | Age: 66
LOS: 1 days | End: 2021-10-01
Payer: MEDICARE

## 2021-10-01 ENCOUNTER — APPOINTMENT (OUTPATIENT)
Dept: RADIOLOGY | Facility: HOSPITAL | Age: 66
End: 2021-10-01
Payer: MEDICARE

## 2021-10-01 ENCOUNTER — RESULT REVIEW (OUTPATIENT)
Age: 66
End: 2021-10-01

## 2021-10-01 ENCOUNTER — APPOINTMENT (OUTPATIENT)
Dept: NEUROLOGY | Facility: CLINIC | Age: 66
End: 2021-10-01

## 2021-10-01 DIAGNOSIS — Z98.890 OTHER SPECIFIED POSTPROCEDURAL STATES: Chronic | ICD-10-CM

## 2021-10-01 DIAGNOSIS — Z90.49 ACQUIRED ABSENCE OF OTHER SPECIFIED PARTS OF DIGESTIVE TRACT: Chronic | ICD-10-CM

## 2021-10-01 PROCEDURE — 74240 X-RAY XM UPR GI TRC 1CNTRST: CPT | Mod: 26

## 2021-10-01 PROCEDURE — 74220 X-RAY XM ESOPHAGUS 1CNTRST: CPT

## 2021-10-01 PROCEDURE — 74240 X-RAY XM UPR GI TRC 1CNTRST: CPT

## 2021-10-07 ENCOUNTER — APPOINTMENT (OUTPATIENT)
Dept: THORACIC SURGERY | Facility: CLINIC | Age: 66
End: 2021-10-07
Payer: MEDICARE

## 2021-10-07 ENCOUNTER — LABORATORY RESULT (OUTPATIENT)
Age: 66
End: 2021-10-07

## 2021-10-07 PROCEDURE — 99024 POSTOP FOLLOW-UP VISIT: CPT

## 2021-10-12 ENCOUNTER — APPOINTMENT (OUTPATIENT)
Dept: CT IMAGING | Facility: CLINIC | Age: 66
End: 2021-10-12
Payer: MEDICARE

## 2021-10-12 ENCOUNTER — OUTPATIENT (OUTPATIENT)
Dept: OUTPATIENT SERVICES | Facility: HOSPITAL | Age: 66
LOS: 1 days | End: 2021-10-12

## 2021-10-12 ENCOUNTER — RESULT REVIEW (OUTPATIENT)
Age: 66
End: 2021-10-12

## 2021-10-12 DIAGNOSIS — Z90.49 ACQUIRED ABSENCE OF OTHER SPECIFIED PARTS OF DIGESTIVE TRACT: Chronic | ICD-10-CM

## 2021-10-12 DIAGNOSIS — Z98.890 OTHER SPECIFIED POSTPROCEDURAL STATES: Chronic | ICD-10-CM

## 2021-10-12 PROCEDURE — 74177 CT ABD & PELVIS W/CONTRAST: CPT | Mod: 26,MH

## 2021-10-12 PROCEDURE — 71260 CT THORAX DX C+: CPT | Mod: 26,MH

## 2021-10-21 ENCOUNTER — APPOINTMENT (OUTPATIENT)
Dept: THORACIC SURGERY | Facility: CLINIC | Age: 66
End: 2021-10-21
Payer: MEDICARE

## 2021-10-21 VITALS
HEIGHT: 63 IN | DIASTOLIC BLOOD PRESSURE: 65 MMHG | OXYGEN SATURATION: 97 % | HEART RATE: 77 BPM | WEIGHT: 179 LBS | RESPIRATION RATE: 17 BRPM | BODY MASS INDEX: 31.71 KG/M2 | SYSTOLIC BLOOD PRESSURE: 101 MMHG | TEMPERATURE: 96.6 F

## 2021-10-21 DIAGNOSIS — Z09 ENCOUNTER FOR FOLLOW-UP EXAMINATION AFTER COMPLETED TREATMENT FOR CONDITIONS OTHER THAN MALIGNANT NEOPLASM: ICD-10-CM

## 2021-10-21 PROCEDURE — 99024 POSTOP FOLLOW-UP VISIT: CPT

## 2022-01-06 ENCOUNTER — APPOINTMENT (OUTPATIENT)
Dept: THORACIC SURGERY | Facility: CLINIC | Age: 67
End: 2022-01-06

## 2023-10-27 NOTE — ED ADULT TRIAGE NOTE - ARRIVAL FROM
Doctor's office Patient pleasant at time of interview and able to answer most of RD questions. She denies food allergies, difficulty chewing/swallowing, without complaints of GI distress at this time. Patient feeding herself lunch, consumed about 50% main entree. Denies weight loss, reports she thinks her weight is around 220 lb with no known weight changes, noted admission weight of 268 lb, question accuracy.

## 2024-06-06 NOTE — PATIENT PROFILE ADULT - NS PRO AD ANY ON CHART
[facility] Medical Oncology and Hematology Team  [facility] [place] - [phone]    Your Team Member:  Advanced Practitioner:  Randee Rios PA-C    Please answer Private and Unavailable Calls - this may be your team(s) contacting you.  If you have medical questions/concerns/issues - contact us either by (1) My Chart (2) [facility]          No